# Patient Record
Sex: MALE | Race: WHITE | NOT HISPANIC OR LATINO | Employment: OTHER | URBAN - METROPOLITAN AREA
[De-identification: names, ages, dates, MRNs, and addresses within clinical notes are randomized per-mention and may not be internally consistent; named-entity substitution may affect disease eponyms.]

---

## 2019-05-02 ENCOUNTER — HOSPITAL ENCOUNTER (OUTPATIENT)
Dept: CARDIOLOGY | Facility: HOSPITAL | Age: 64
Discharge: HOME | End: 2019-05-02
Attending: OPHTHALMOLOGY

## 2020-06-25 ENCOUNTER — OFFICE VISIT CONVERTED (OUTPATIENT)
Dept: FAMILY MEDICINE CLINIC | Facility: CLINIC | Age: 65
End: 2020-06-25
Attending: NURSE PRACTITIONER

## 2020-06-25 ENCOUNTER — HOSPITAL ENCOUNTER (OUTPATIENT)
Dept: LAB | Facility: HOSPITAL | Age: 65
Discharge: HOME | End: 2020-06-25
Attending: NURSE PRACTITIONER

## 2020-06-25 ENCOUNTER — CONVERSION ENCOUNTER (OUTPATIENT)
Dept: FAMILY MEDICINE CLINIC | Facility: CLINIC | Age: 65
End: 2020-06-25

## 2020-06-25 LAB
ALBUMIN SERPL-MCNC: 4.5 G/DL (ref 3.5–5)
ALBUMIN/GLOB SERPL: 1.6 {RATIO} (ref 1.4–2.6)
ALP SERPL-CCNC: 72 U/L (ref 56–155)
ALT SERPL-CCNC: 21 U/L (ref 10–40)
ANION GAP SERPL CALC-SCNC: 21 MMOL/L (ref 8–19)
AST SERPL-CCNC: 24 U/L (ref 15–50)
BASOPHILS # BLD AUTO: 0.07 10*3/UL (ref 0–0.2)
BASOPHILS NFR BLD AUTO: 0.9 % (ref 0–3)
BILIRUB SERPL-MCNC: 0.59 MG/DL (ref 0.2–1.3)
BUN SERPL-MCNC: 24 MG/DL (ref 5–25)
BUN/CREAT SERPL: 23 {RATIO} (ref 6–20)
CALCIUM SERPL-MCNC: 10.1 MG/DL (ref 8.7–10.4)
CHLORIDE SERPL-SCNC: 104 MMOL/L (ref 99–111)
CHOLEST SERPL-MCNC: 150 MG/DL (ref 107–200)
CHOLEST/HDLC SERPL: 2.8 {RATIO} (ref 3–6)
CONV ABS IMM GRAN: 0.04 10*3/UL (ref 0–0.2)
CONV CO2: 20 MMOL/L (ref 22–32)
CONV IMMATURE GRAN: 0.5 % (ref 0–1.8)
CONV TOTAL PROTEIN: 7.3 G/DL (ref 6.3–8.2)
CREAT UR-MCNC: 1.03 MG/DL (ref 0.7–1.2)
CRP SERPL-MCNC: 0.6 MG/L (ref 0–5)
DEPRECATED RDW RBC AUTO: 46.1 FL (ref 35.1–43.9)
EOSINOPHIL # BLD AUTO: 0.12 10*3/UL (ref 0–0.7)
EOSINOPHIL # BLD AUTO: 1.5 % (ref 0–7)
ERYTHROCYTE [DISTWIDTH] IN BLOOD BY AUTOMATED COUNT: 13.8 % (ref 11.6–14.4)
ERYTHROCYTE [SEDIMENTATION RATE] IN BLOOD: 2 MM/H (ref 0–20)
EST. AVERAGE GLUCOSE BLD GHB EST-MCNC: 126 MG/DL
GFR SERPLBLD BASED ON 1.73 SQ M-ARVRAT: >60 ML/MIN/{1.73_M2}
GLOBULIN UR ELPH-MCNC: 2.8 G/DL (ref 2–3.5)
GLUCOSE SERPL-MCNC: 143 MG/DL (ref 70–99)
HBA1C MFR BLD: 6 % (ref 3.5–5.7)
HCT VFR BLD AUTO: 44.9 % (ref 42–52)
HDLC SERPL-MCNC: 53 MG/DL (ref 40–60)
HGB BLD-MCNC: 14.7 G/DL (ref 14–18)
LDLC SERPL CALC-MCNC: 84 MG/DL (ref 70–100)
LYMPHOCYTES # BLD AUTO: 1.94 10*3/UL (ref 1–5)
LYMPHOCYTES NFR BLD AUTO: 23.6 % (ref 20–45)
MCH RBC QN AUTO: 29.6 PG (ref 27–31)
MCHC RBC AUTO-ENTMCNC: 32.7 G/DL (ref 33–37)
MCV RBC AUTO: 90.5 FL (ref 80–96)
MONOCYTES # BLD AUTO: 0.72 10*3/UL (ref 0.2–1.2)
MONOCYTES NFR BLD AUTO: 8.7 % (ref 3–10)
NEUTROPHILS # BLD AUTO: 5.34 10*3/UL (ref 2–8)
NEUTROPHILS NFR BLD AUTO: 64.8 % (ref 30–85)
NRBC CBCN: 0 % (ref 0–0.7)
OSMOLALITY SERPL CALC.SUM OF ELEC: 297 MOSM/KG (ref 273–304)
PLATELET # BLD AUTO: 216 10*3/UL (ref 130–400)
PMV BLD AUTO: 11 FL (ref 9.4–12.4)
POTASSIUM SERPL-SCNC: 4.8 MMOL/L (ref 3.5–5.3)
PSA SERPL-MCNC: 0.51 NG/ML (ref 0–4)
RBC # BLD AUTO: 4.96 10*6/UL (ref 4.7–6.1)
SODIUM SERPL-SCNC: 140 MMOL/L (ref 135–147)
T4 FREE SERPL-MCNC: 1.3 NG/DL (ref 0.9–1.8)
TRIGL SERPL-MCNC: 67 MG/DL (ref 40–150)
TSH SERPL-ACNC: 2.12 M[IU]/L (ref 0.27–4.2)
VLDLC SERPL-MCNC: 13 MG/DL (ref 5–37)
WBC # BLD AUTO: 8.23 10*3/UL (ref 4.8–10.8)

## 2020-06-30 ENCOUNTER — HOSPITAL ENCOUNTER (OUTPATIENT)
Dept: CT IMAGING | Facility: HOSPITAL | Age: 65
Discharge: HOME | End: 2020-06-30
Attending: NURSE PRACTITIONER

## 2020-07-29 ENCOUNTER — HOSPITAL ENCOUNTER (OUTPATIENT)
Dept: MRI IMAGING | Facility: HOSPITAL | Age: 65
Discharge: HOME | End: 2020-07-29
Attending: NURSE PRACTITIONER

## 2020-07-29 ENCOUNTER — OFFICE VISIT CONVERTED (OUTPATIENT)
Dept: FAMILY MEDICINE CLINIC | Facility: CLINIC | Age: 65
End: 2020-07-29
Attending: NURSE PRACTITIONER

## 2020-08-13 ENCOUNTER — OFFICE VISIT CONVERTED (OUTPATIENT)
Dept: OTOLARYNGOLOGY | Facility: CLINIC | Age: 65
End: 2020-08-13
Attending: OTOLARYNGOLOGY

## 2020-08-20 ENCOUNTER — CONVERSION ENCOUNTER (OUTPATIENT)
Dept: FAMILY MEDICINE CLINIC | Facility: CLINIC | Age: 65
End: 2020-08-20

## 2020-08-20 ENCOUNTER — OFFICE VISIT CONVERTED (OUTPATIENT)
Dept: FAMILY MEDICINE CLINIC | Facility: CLINIC | Age: 65
End: 2020-08-20
Attending: PHYSICIAN ASSISTANT

## 2020-08-21 ENCOUNTER — CONVERSION ENCOUNTER (OUTPATIENT)
Dept: FAMILY MEDICINE CLINIC | Facility: CLINIC | Age: 65
End: 2020-08-21

## 2020-09-25 ENCOUNTER — HOSPITAL ENCOUNTER (OUTPATIENT)
Dept: LAB | Facility: HOSPITAL | Age: 65
Discharge: HOME | End: 2020-09-25
Attending: NURSE PRACTITIONER

## 2020-09-25 LAB
25(OH)D3 SERPL-MCNC: 57.3 NG/ML (ref 30–100)
ALBUMIN SERPL-MCNC: 4.3 G/DL (ref 3.5–5)
ALBUMIN/GLOB SERPL: 1.5 {RATIO} (ref 1.4–2.6)
ALP SERPL-CCNC: 83 U/L (ref 56–155)
ALT SERPL-CCNC: 22 U/L (ref 10–40)
ANION GAP SERPL CALC-SCNC: 20 MMOL/L (ref 8–19)
AST SERPL-CCNC: 25 U/L (ref 15–50)
BASOPHILS # BLD AUTO: 0.07 10*3/UL (ref 0–0.2)
BASOPHILS NFR BLD AUTO: 0.9 % (ref 0–3)
BILIRUB SERPL-MCNC: 0.47 MG/DL (ref 0.2–1.3)
BUN SERPL-MCNC: 18 MG/DL (ref 5–25)
BUN/CREAT SERPL: 17 {RATIO} (ref 6–20)
CALCIUM SERPL-MCNC: 9.7 MG/DL (ref 8.7–10.4)
CHLORIDE SERPL-SCNC: 105 MMOL/L (ref 99–111)
CHOLEST SERPL-MCNC: 99 MG/DL (ref 107–200)
CHOLEST/HDLC SERPL: 2.3 {RATIO} (ref 3–6)
CONV ABS IMM GRAN: 0.02 10*3/UL (ref 0–0.2)
CONV CO2: 22 MMOL/L (ref 22–32)
CONV IMMATURE GRAN: 0.3 % (ref 0–1.8)
CONV TOTAL PROTEIN: 7.1 G/DL (ref 6.3–8.2)
CREAT UR-MCNC: 1.08 MG/DL (ref 0.7–1.2)
DEPRECATED RDW RBC AUTO: 43.5 FL (ref 35.1–43.9)
EOSINOPHIL # BLD AUTO: 0.16 10*3/UL (ref 0–0.7)
EOSINOPHIL # BLD AUTO: 2.1 % (ref 0–7)
ERYTHROCYTE [DISTWIDTH] IN BLOOD BY AUTOMATED COUNT: 13.2 % (ref 11.6–14.4)
FOLATE SERPL-MCNC: 15.3 NG/ML (ref 4.8–20)
GFR SERPLBLD BASED ON 1.73 SQ M-ARVRAT: >60 ML/MIN/{1.73_M2}
GLOBULIN UR ELPH-MCNC: 2.8 G/DL (ref 2–3.5)
GLUCOSE SERPL-MCNC: 128 MG/DL (ref 70–99)
HCT VFR BLD AUTO: 44.4 % (ref 42–52)
HDLC SERPL-MCNC: 43 MG/DL (ref 40–60)
HGB BLD-MCNC: 14.6 G/DL (ref 14–18)
IRON SATN MFR SERPL: 30 % (ref 20–55)
IRON SERPL-MCNC: 87 UG/DL (ref 70–180)
LDLC SERPL CALC-MCNC: 45 MG/DL (ref 70–100)
LYMPHOCYTES # BLD AUTO: 1.92 10*3/UL (ref 1–5)
LYMPHOCYTES NFR BLD AUTO: 25.1 % (ref 20–45)
MCH RBC QN AUTO: 29.7 PG (ref 27–31)
MCHC RBC AUTO-ENTMCNC: 32.9 G/DL (ref 33–37)
MCV RBC AUTO: 90.4 FL (ref 80–96)
MONOCYTES # BLD AUTO: 0.81 10*3/UL (ref 0.2–1.2)
MONOCYTES NFR BLD AUTO: 10.6 % (ref 3–10)
NEUTROPHILS # BLD AUTO: 4.68 10*3/UL (ref 2–8)
NEUTROPHILS NFR BLD AUTO: 61 % (ref 30–85)
NRBC CBCN: 0 % (ref 0–0.7)
OSMOLALITY SERPL CALC.SUM OF ELEC: 298 MOSM/KG (ref 273–304)
PLATELET # BLD AUTO: 205 10*3/UL (ref 130–400)
PMV BLD AUTO: 10.5 FL (ref 9.4–12.4)
POTASSIUM SERPL-SCNC: 4.7 MMOL/L (ref 3.5–5.3)
RBC # BLD AUTO: 4.91 10*6/UL (ref 4.7–6.1)
SODIUM SERPL-SCNC: 142 MMOL/L (ref 135–147)
TIBC SERPL-MCNC: 292 UG/DL (ref 245–450)
TRANSFERRIN SERPL-MCNC: 204 MG/DL (ref 215–365)
TRIGL SERPL-MCNC: 56 MG/DL (ref 40–150)
VIT B12 SERPL-MCNC: 465 PG/ML (ref 211–911)
VLDLC SERPL-MCNC: 11 MG/DL (ref 5–37)
WBC # BLD AUTO: 7.66 10*3/UL (ref 4.8–10.8)

## 2021-02-04 ENCOUNTER — OFFICE VISIT (OUTPATIENT)
Dept: NEUROLOGY | Facility: CLINIC | Age: 66
End: 2021-02-04

## 2021-02-04 VITALS
DIASTOLIC BLOOD PRESSURE: 92 MMHG | BODY MASS INDEX: 29.35 KG/M2 | HEART RATE: 58 BPM | HEIGHT: 70 IN | SYSTOLIC BLOOD PRESSURE: 152 MMHG | OXYGEN SATURATION: 98 % | WEIGHT: 205 LBS

## 2021-02-04 DIAGNOSIS — E11.42 DIABETIC PERIPHERAL NEUROPATHY (HCC): ICD-10-CM

## 2021-02-04 DIAGNOSIS — I95.1 ORTHOSTASIS: Primary | ICD-10-CM

## 2021-02-04 PROCEDURE — 99204 OFFICE O/P NEW MOD 45 MIN: CPT | Performed by: PSYCHIATRY & NEUROLOGY

## 2021-02-04 RX ORDER — ASPIRIN 81 MG/1
81 TABLET ORAL DAILY
COMMUNITY

## 2021-02-04 RX ORDER — MELOXICAM 7.5 MG/1
TABLET ORAL
COMMUNITY
Start: 2020-12-06 | End: 2021-02-04

## 2021-02-04 RX ORDER — VIT C/E/ZN/COPPR/LUTEIN/ZEAXAN 250MG-90MG
CAPSULE ORAL
COMMUNITY

## 2021-02-04 RX ORDER — ROSUVASTATIN CALCIUM 5 MG/1
TABLET, COATED ORAL
COMMUNITY
Start: 2020-09-20

## 2021-02-04 NOTE — PROGRESS NOTES
CC: Lightheadedness; abnormal MRI of the brain    HPI:  José Lyles is a  65 y.o.  right-handed white male who was sent for neurological consultation by GLADYS Pérez in [place] regarding the symptom of lightheadedness as well as an abnormal MRI of the brain.  The patient states that his symptoms actually started about 6 years ago and have gotten worse over time.  He describes a lightheaded feeling not spinning dizziness.  He denies ever having specific vertigo.  He describes typical symptoms occurring when he sits up or stands up quickly and his vision may blurred or even get black.  He has passed out twice.  He has similar symptoms if he tilts his head up and looks up.  He had an evaluation including an MRI of the brain and CT angiogram of the neck and brain arteries.  He had atrophy and small vessel changes on the MRI.  He had about 52% left ICA stenosis and about 12% right ICA stenosis without vertebrobasilar abnormalities mentioned.  He is followed by ophthalmology starting about 2 years ago and found to have macular degeneration.  He is not on any blood pressure medication or heart medicine.  He had been on Flomax for prostatism before but has not been on that for quite a while.  He states his symptoms are worse after a hot shower or when he is exercising strenuously such as mowing the yard.  He is treated with aspirin 81 mg and Crestor 5 mg.    He has history of head injury in the fourth grade without loss of consciousness.  No meningitis, seizure or known stroke.  There is no family history of stroke, brain tumor brain hemorrhage or aneurysm of the brain artery.  About 2 months ago he developed some left hip pain thought to be sciatica.  Symptoms have improved.  He does not have history of smoking or alcohol or drug abuse.    He describes some degree of numbness in his feet.  He states he previously weighed almost 100 pounds greater than he weighs now.  He states his wife checked a blood sugar  on him and it was 300 and so he dieted and exercised significantly to bring this down.  He states that his hemoglobin A1c has been running between 5.4 and 6.0.  He lost 90 pounds.        Past Medical History:   Diagnosis Date   • Arthritis    • Bell palsy    • Carotid artery occlusion    • Coronary artery disease    • Difficulty walking    • Headache, tension-type    • Kidney stones    • Sleep apnea          Past Surgical History:   Procedure Laterality Date   • COLONOSCOPY     • TONSILLECTOMY             Current Outpatient Medications:   •  aspirin 81 MG EC tablet, Take 81 mg by mouth Daily., Disp: , Rfl:   •  Multiple Vitamins-Minerals (PreserVision AREDS 2) chewable tablet, Chew., Disp: , Rfl:   •  rosuvastatin (CRESTOR) 5 MG tablet, TAKE 1 TABLET BY MOUTH ONCE DAILY, Disp: , Rfl:   •  vitamin D3 125 MCG (5000 UT) capsule capsule, Take 5,000 Units by mouth Daily., Disp: , Rfl:       Family History   Problem Relation Age of Onset   • Dementia Mother    • Migraines Brother          Social History     Socioeconomic History   • Marital status:      Spouse name: Not on file   • Number of children: Not on file   • Years of education: Not on file   • Highest education level: Not on file   Tobacco Use   • Smoking status: Never Smoker   • Smokeless tobacco: Never Used   Substance and Sexual Activity   • Alcohol use: Never     Frequency: Never   • Drug use: Never   • Sexual activity: Defer     Partners: Female         Allergies   Allergen Reactions   • Ciprofloxacin Other (See Comments)   • Penicillins Other (See Comments)   • Sulfamethoxazole-Trimethoprim Other (See Comments)         Pain Scale: 3 or 4/10, low back pain        ROS:  Review of Systems   Constitutional: Negative for activity change, appetite change and fatigue.   HENT: Negative for ear pain, facial swelling and hearing loss.    Eyes: Positive for visual disturbance. Negative for pain, redness and itching.   Respiratory: Positive for apnea. Negative  "for cough, choking and shortness of breath.    Cardiovascular: Negative for chest pain and leg swelling.   Gastrointestinal: Negative for abdominal pain, nausea and vomiting.   Endocrine: Negative for cold intolerance, heat intolerance and polydipsia.   Genitourinary: Positive for flank pain.   Musculoskeletal: Positive for back pain, gait problem and neck pain. Negative for arthralgias and joint swelling.   Skin: Negative for color change, pallor and rash.   Allergic/Immunologic: Negative for environmental allergies and food allergies.   Neurological: Positive for light-headedness. Negative for dizziness, tremors, seizures, syncope, facial asymmetry, speech difficulty, weakness, numbness and headaches.   Hematological: Bruises/bleeds easily.   Psychiatric/Behavioral: Negative for agitation, behavioral problems, confusion, decreased concentration, dysphoric mood, hallucinations, self-injury, sleep disturbance and suicidal ideas. The patient is not nervous/anxious and is not hyperactive.          I have reviewed and agree with the above ROS completed by the medical assistant.      Physical Exam:  Vitals:    02/04/21 1011   BP: 152/92   Pulse: 58   SpO2: 98%   Weight: 93 kg (205 lb)   Height: 177.8 cm (70\")     Orthostatic BP: Supine blood pressure 106/76, heart rate 72.  Standing blood pressure 92/68 heart rate 96.    Body mass index is 29.41 kg/m².    Physical Exam  General: Overweight white male no acute distress  HEENT: Normocephalic no evidence of trauma.  Discs difficult to see.  Throat  Neck: Supple.  No thyromegaly.  No cervical bruits.  Heart: Regular rate and rhythm no murmurs.  No pedal edema.  Extremities: Radial pulses strong and simultaneous.      Neurological Exam:   Mental Status: Awake, alert, oriented to person, place and time.  Conversant without evidence of an affective disorder, thought disorder, delusions or hallucinations.  Attention span and concentration are normal.  HCF: No aphasia, apraxia " or dysarthria.  Recent and remote memory intact.  Knowledge of recent events intact.  CN: I:   II: Visual fields full without left inattention   III, IV, VI: Eye movements intact without nystagmus or ptosis.  Pupils equal round and reactive to light.   V,VII: Light touch and pinprick intact all 3 divisions of V.  Facial muscles symmetrical.   VIII: Hearing intact to finger rub   IX,X: Soft palate elevates symmetrically   XI: Sternomastoid and trapezius are strong.   XII: Tongue midline without atrophy or fasciculations  Motor: Normal tone and bulk in the upper and lower extremities   Power testing: Mild weakness of interossei and abductor pollicis brevis muscles bilaterally with good power otherwise in the upper extremities.  In the lower extremities there is weakness of toe extension and flexion.  All other muscles tested in the legs were normal.  Reflexes: Upper extremities: +1 bicep and brachioradialis jerk trace triceps jerks        Lower extremities: +1 knee jerks and trace ankle jerks        Toe signs: Downgoing  Sensory: Light touch: Diffusely intact upper extremities.  Reduced on the toes        Pinprick: Diffusely intact upper extremities.  Reduced on the toes        Vibration: Essentially normal at the ankle        Position: Normal at the great toes    Cerebellar: Finger-to-nose: Normal           Rapid movement:  normal           Heel-to-shin: Normal  Gait and Station: Comes to stand without difficulty.  Casual walk is slightly broad-based.  He can toe and heel walk adequately.  Tandem walk is mildly challenging.  He titubates on Romberg testing.  No drift      Results:      No results found for: GLUCOSE, BUN, CREATININE, EGFRIFNONA, EGFRIFAFRI, BCR, CO2, CALCIUM, PROTENTOTREF, ALBUMIN, LABIL2, BILIRUBIN, AST, ALT    No results found for: WBC, HGB, HCT, MCV, PLT      .No results found for: RPR      No results found for: TSH, Q3YYKZN, E8CUCEA, THYROIDAB      No results found for: [id number]      No results  found for: FOLATE      No results found for: HGBA1C      No results found for: GLUCOSE, BUN, CREATININE, EGFRIFNONA, EGFRIFAFRI, BCR, K, CO2, CALCIUM, PROTENTOTREF, ALBUMIN, LABIL2, BILIRUBIN, AST, ALT      No results found for: WBC, HGB, HCT, MCV, PLT    I reviewed the records including the MRI of the brain and CT angiogram of the neck and brain arteries by report as noted above      Assessment:   1.  Lightheadedness-this is related to orthostasis.  He did not have vertebrobasilar disease on his CT angiogram.  His standing blood pressure was 92 systolic and his heart rate went up more than 20 bpm.  I think it is most likely related to diabetic neuropathy.  2.  Diabetic peripheral neuropathy-mild symptoms.  Exam appears typical with most distal muscles weak and some sensory deficits in the toes.  3.  Small vessel changes on MRI  4.  Suspect mechanical vertebrobasilar insufficiency with lightheadedness occurring with head tilted back compressing the vertebral arteries at the skull base.  I told the patient that there was no clear remedy for this and that people without artery disease can get it.  Be observant about the symptoms and try to avoid tilting the head back more than just transiently.    Plan:  1.  I agree with risk factor control particularly his lipids with target LDL of less than 70.  Also agree with continued aspirin 81 mg daily  2.  Although his initial blood pressure was elevated in the office at the end of his visit his supine blood pressure was just 106/76.  I would not be at all aggressive at controlling individual blood pressures since it would more likely contribute to his orthostasis.  Treatment of orthostasis includes basically salt and water and we recommended he consider support hose.  After that a trial of Florinef would be an option prior to considering a drug like midodrine which is a vasoconstrictor.  3.  He could be further evaluated for peripheral neuropathy however it appears obvious  that he has a diabetic condition to be the origin based on his own history.  4.  Follow-up if symptoms worsen.  It is reasonable for him to be checking his supine and standing blood pressures at home to follow along and troubleshoot for worsening problems.            Time: 45 minutes              Dictated utilizing Dragon dictation.

## 2021-05-10 NOTE — H&P
"   History and Physical      Patient Name: José Lyles   Patient ID: [id number]   Sex: Male   YOB: 1955    Primary Care Provider: Deb GRAHAM   Referring Provider: Jael GE    Visit Date: August 13, 2020    Provider: Ehsan Marmolejo MD   Location: [facility]   Location Address: [address] [place] [address], [address] [place] [address]  [id number]   Location Phone: [phone]          Chief Complaint     1.  Dizziness    2.  Lightheadedness    3.  Tinnitus       History Of Present Illness  José Lyles is a 65 year old /White male who presents to the office today as a consult from Jale GE.      He presents to the clinic today for evaluation of issues with dizziness, lightheadedness, and ear ringing in the right ear.  He notes that his hearing and ringing issues have been going on for quite some time.  He does not note any association between changes in his hearing or ringing in his dizzy spells.  He feels that his head feels \"swimmy headed.  He denies any significant vertigo with this.  He describes this is more of a lightheaded feeling.  He notes that sometimes he gets lightheadedness during exercise or exertion.  He does have a history of heavy drinking, and still binges at times.  He notes that sometimes he tends to veer while walking.  He did have an MRI of the brain which showed mild atrophy and white matter changes.  IACs were normal.  There is some evidence of scattered lacunar infarcts.  He also had a CT angiogram which demonstrated 52% stenosis in the proximal left internal carotid artery but no major intracranial arteries show decreased flow.       Past Medical History  Acute prostatitis; Arthritis; Bladder Disorder; Chronic prostatitis; Dizziness; Kidney stones; Prostate Disorder; Screening PSA (prostate specific antigen)         Past Surgical History  Colonoscopy; Knee scope with removal of cartilage from knee joint; Rectal surgery; Tonsilectomy " "        Medication List  aspirin 81 mg oral tablet,chewable; Crestor 5 mg oral tablet; PreserVision AREDS-2 [id number] mg-unit-mg-mg oral capsule; Vitamin D3 125 mcg (5,000 unit) oral tablet         Allergy List  Bactrim DS; Cipro; PENICILLINS; SULFA (SULFONAMIDES)         Family Medical History  *No Known Family History         Social History  Alcohol (Current some day); ; Moderate Amount of Exercise (1-3 times weekly); Tobacco (Never)         Immunizations  Name Date Admin   Influenza    Prevnar 13    Tdap          Review of Systems  · Constitutional  o Denies  o : fever, night sweats, weight loss  · Eyes  o Denies  o : discharge from eye, impaired vision  · HENT  o Admits  o : *See HPI  · Cardiovascular  o Denies  o : chest pain, irregular heart beats  · Respiratory  o Denies  o : shortness of breath, wheezing, coughing up blood  · Gastrointestinal  o Denies  o : heartburn, reflux, vomiting blood  · Genitourinary  o Denies  o : frequency  · Integument  o Denies  o : rash, skin dryness  · Neurologic  o Admits  o : loss of balance, dizziness  o Denies  o : seizures, loss of consciousness  · Endocrine  o Admits  o : cold intolerance  o Denies  o : heat intolerance  · Heme-Lymph  o Admits  o : easy bleeding  o Denies  o : anemia      Vitals  Date Time BP Position Site L\R Cuff Size HR RR TEMP (F) WT  HT  BMI kg/m2 BSA m2 O2 Sat        08/13/2020 01:51 PM       16 97.6 214lbs 6oz 5'  10\" 30.76 2.19           Physical Examination  · Constitutional  o Appearance  o : well developed, well-nourished, alert and in no acute distress, voice clear and strong  · Head and Face  o Head  o :   § Inspection  § : no deformities or lesions  o Face  o :   § Inspection  § : No facial lesions; House-Brackmann I/VI bilaterally  § Palpation  § : No TMJ crepitus nor  muscle tenderness bilaterally  · Eyes  o Vision  o :   § Visual Fields  § : Extraocular movements are intact. No spontaneous or gaze-induced " nystagmus.  o Conjunctivae  o : clear  o Sclerae  o : clear  o Pupils and Irises  o : pupils equal, round, and reactive to light.   · Ears, Nose, Mouth and Throat  o Ears  o :   § External Ears  § : appearance within normal limits, no lesions present  § Otoscopic Examination  § : tympanic membrane appearance within normal limits bilaterally without perforations, well-aerated middle ears  § Hearing  § : intact to conversational voice both ears  o Nose  o :   § External Nose  § : appearance normal  § Intranasal Exam  § : mucosa within normal limits, vestibules normal, no intranasal lesions present, septum midline, sinuses non tender to percussion  o Oral Cavity  o :   § Oral Mucosa  § : oral mucosa normal without pallor or cyanosis  § Lips  § : lip appearance normal  § Teeth  § : normal dentition for age  § Gums  § : gums pink, non-swollen, no bleeding present  § Tongue  § : tongue appearance normal; normal mobility  § Palate  § : hard palate normal, soft palate appearance normal with symmetric mobility  o Throat  o :   § Oropharynx  § : no inflammation or lesions present, tonsils within normal limits  § Hypopharynx  § : appearance within normal limits, superior epiglottis within normal limits  § Larynx  § : appearance within normal limits, vocal cords within normal limits, no lesions present  · Neck  o Inspection/Palpation  o : normal appearance, no masses or tenderness, trachea midline; thyroid size normal, nontender, no nodules or masses present on palpation  · Respiratory  o Respiratory Effort  o : breathing unlabored  o Inspection of Chest  o : normal appearance, no retractions  · Cardiovascular  o Heart  o : regular rate and rhythm  · Lymphatic  o Neck  o : no lymphadenopathy present  o Supraclavicular Nodes  o : no lymphadenopathy present  o Preauricular Nodes  o : no lymphadenopathy present  · Skin and Subcutaneous Tissue  o General Inspection  o : Regarding face and neck - there are no rashes present, no  "lesions present, and no areas of discoloration  · Neurologic  o Cranial Nerves  o : cranial nerves II-XII are grossly intact bilaterally  o Gait and Station  o : normal gait, some difficulty with tandem gait, negative Romberg, negative Garett-Hallpike maneuver, negative Fukuda marching test, able to stand without diffculty  · Psychiatric  o Judgement and Insight  o : judgment and insight intact  o Mood and Affect  o : mood normal, affect appropriate          Assessment  · Dizziness     780.4/R42  · Hearing loss     389.9/H91.90  · Tinnitus     388.30/H93.19  · Lightheadedness     780.4/R42    Problems Reconciled  Plan  · Orders  o Audiometry, pure-tone (threshold); [facility] (16193) - 780.4/R42, 389.9/H91.90, 388.30/H93.19 - 08/27/2020  o Tympanogram (Impedance Testing) [facility] (80834) - 780.4/R42, 389.9/H91.90, 388.30/H93.19 - 08/27/2020  · Medications  o Medications have been Reconciled  o Transition of Care or Provider Policy  · Instructions  o He presents to the clinic today for evaluation of issues with dizziness, lightheadedness, and ear ringing in the right ear. He notes that his hearing and ringing issues have been going on for quite some time. He does not note any association between changes in his hearing or ringing in his dizzy spells. He feels that his head feels \"swimmy headed. He denies any significant vertigo with this. He describes this is more of a lightheaded feeling. He notes that sometimes he gets lightheadedness during exercise or exertion. He does have a history of heavy drinking, and still binges at times. He notes that sometimes he tends to veer while walking. He did have an MRI of the brain which showed mild atrophy and white matter changes. IACs were normal. There is some evidence of scattered lacunar infarcts. He also had a CT angiogram which demonstrated 52% stenosis in the proximal left internal carotid artery but no major intracranial arteries show decreased flow. Examination today, there " was no evidence of focal vestibular dysfunction, but he did note lightheadedness on arising from a laying back position. This may be related to orthostatic hypotension. We discussed adequate hydration and taking time when switching from a laying down or sitting to standing position to avoid syncope. I will plan on obtaining an audiogram and tympanogram and we will see him back in the clinic in 3 months. I did mention some form of vestibular exercises that I will have him do. He is also due to have evaluation by neurology for his CTA findings. We also discussed formal vestibular testing should there be any progression to his symptoms or failure to improve.  o Electronically Identified Patient Education Materials Provided Electronically            Electronically Signed by: Ehsan Marmolejo MD -Author on August 27, 2020 12:59:53 PM

## 2021-05-13 NOTE — PROGRESS NOTES
Progress Note      Patient Name: oJsé Lyles   Patient ID: [id number]   Sex: Male   YOB: 1955    Primary Care Provider: Jael GE   Referring Provider: Jaime Blue MD    Visit Date: June 25, 2020    Provider: LUMA Harry   [facility]   Location Address: [address] [place] [address], [address]  [place]  [id number]   Location Phone: [phone]          Chief Complaint  · patient establishment  · dizziness     He is here to establish care. He was previously seeing Jaime Felipe. He has a history of elevated blood sugar without the diagnosis of diabetes, Macular Degeneration, and arthritis. He saw Hakeem in 2019 and has a follow up July 6th. His last labs were November 2019 and his HgA1c was 5.9% at that time. He has been having an issue with dizziness but was told that it was due to inner ear and old age. He was told to take Zyrtec but it has not helped. He denies chest pain, nausea.      Colonoscopy 2020 [facility]--called for records today  Hakeem 6/2019  labs November 2019         History Of Present Illness  José Lyles is a 65 year old /White male who presents for evaluation and treatment of:      the patient presents in the office today as a new patient and he has h/o impaired glucose tolerance and chronic daily vertigo and he also has a h/o of bleeding in the back of both eyes or macular degeneration or both it is hard to tell from the patient stated history I will try to get records from ophthalmology he stated he has been told he has the beginning stages of macular degeneration and he has OA and he has a h/o has growth in rectum and hyperplastic polyp in his colon  at , h/o chronic pain left hip and back had a long time he was on Mobic after his colonoscopy the hip pain got better I looked on an old CT and he has T12 compression fracture anterior and 18% we have discussed this today and he stated he has never been told this and on  his   ophth report he has OIS billy with age related cataracts and he was referred to vascular for carotid duplex which showed mod-severe non-stenotic calcified plaque so we are going to look a bit closer at this and see if he has any other vascular changes to account for his eyes and his dizziness he states he was not told about the OIS either I have put him on baby asa and crestor       Past Medical History  Disease Name Date Onset Notes   Acute prostatitis 12/16/2015 --    Arthritis --  --    Bladder Disorder --  --    Chronic prostatitis 01/19/2016 --    Kidney stones --  --    Prostate Disorder --  --    Screening PSA (prostate specific antigen) --  --          Past Surgical History  Procedure Name Date Notes   Colonoscopy --  --    Knee scope with removal of cartilage from knee joint --  --    Tonsilectomy --  --          Medication List  Name Date Started Instructions   Crestor 5 mg oral tablet 06/25/2020 take 1 tablet (5 mg) by oral route once daily   Mobic 15 mg oral tablet  take 1 tablet (15 mg) by oral route once daily   PreserVision AREDS-2 869-338-74-1 mg-unit-mg-mg oral capsule  take 2 capsules by oral route daily         Allergy List  Allergen Name Date Reaction Notes   Bactrim DS --  rash --    PENICILLINS --  swelling --    SULFA (SULFONAMIDES) --  --  --          Family Medical History  Disease Name Relative/Age Notes   *No Known Family History  --          Social History  Finding Status Start/Stop Quantity Notes   Alcohol Current some day 20/-- --  drinks no  drinks monthly; beer and liquor    --  --/-- --  --    Moderate Amount of Exercise (1-3 times weekly) --  --/-- --  --    Tobacco Never --/-- --  never a smoker         Immunizations  NameDate Admin Mfg Trade Name Lot Number Route Inj VIS Given VIS Publication   Tmdiidgnj90/01/2019 SKB Fluzone Quadrivalent  NE NE 06/25/2020    Comments:    Prevnar 1310/01/2019 NE Not Entered  NE NE 06/25/2020    Comments:    Tdap01/01/2020 KWADWO BOOSTRIX   "NE NE 06/25/2020    Comments:          Review of Systems  · Constitutional  o Denies  o : fever, weight gain, weight loss, malaise/fatigue  · Eyes  o Admits  o : ocular ischemic syndrome recent visual changes  o Denies  o : diplopia, redness of eye, eye pain, discharge from eye  · HENT  o Denies  o : sore throat, hearing changes, tinnitus, nose bleeding, sinus pain, nasal discharge, ear pain  · Cardiovascular  o Admits  o : mod plaque billy carotids no stenosis  o Denies  o : palpitation, chest pain, claudication, pedal edema  · Respiratory  o Denies  o : shortness of breath, [facility], cough  · Gastrointestinal  o Denies  o : nausea, vomiting, reflux, diarrhea, constipation, abdominal pain, blood in stools  · Genitourinary  o Denies  o : frequency, urgency, dysuria, incontinence, nocturia,   · Neurologic  o Admits  o : dizziness chronic  o Denies  o : unsteady gait, weakness, H/A  · Musculoskeletal  o Admits  o : left hip pain h/o compression fracture T12  o Denies  o : myalgias, joint swelling  · Endocrine  o Admits  o : impaired glucose tolerance  o Denies  o : heat intolerance, cold intolerance, polyuria, polydipsia  · Psychiatric  o Denies  o : mood changes, hallucinations, memory  · Heme-Lymph  o Denies  o : easy bleeding, easy bruising, edema, lymph node enlargement or tenderness      Vitals  Date Time BP Position Site L\R Cuff Size HR RR TEMP (F) WT  HT  BMI kg/m2 BSA m2 O2 Sat        06/25/2020 09:20 /74 Sitting    75 - R   222lbs 0oz 5'  10\" 31.85 2.23 99 %          Physical Examination  · Constitutional  o Appearance  o : well-nourished, well developed, alert, in no acute distress  · Ears, Nose, Mouth and Throat  o Ears  o :   § External Ears  § : appearance within normal limits, no lesions present  § Otoscopic Examination  § : tympanic membrane appearance within normal limits bilaterally without perforations, mobility normal  o Nose  o :   § External Nose  § : normal stucture noted.  o Oral Cavity  o : "   § Oral Mucosa  § : oral mucosa normal without pallor or cyanosis  § Lips  § : lip appearance normal  § Teeth  § : normal dentition for age  § Gums  § : gums pink, non-swollen, no bleeding present  § Tongue  § : tongue appearance normal  § Palate  § : hard palate normal, soft palate appearance normal  o Throat  o :   § Oropharynx  § : no inflammation or lesions present, tonsils within normal limits  · Neck  o Thyroid  o : gland size normal, nontender, no nodules or masses present on palpation, symmetric  · Respiratory  o Respiratory Effort  o : breathing unlabored  o Auscultation of Lungs  o : normal breath sounds throughout  · Cardiovascular  o Heart  o :   § Auscultation of Heart  § : regular rate and rhythm, no murmurs, gallops or rubs  § Palpation of Heart  § : normal apical impulse, no cardiac thrill present  o Peripheral Vascular System  o :   § Carotid Arteries  § : normal pulses bilaterally, no bruits present  § Extremities  § : no cyanosis, clubbing or edema; less than 2 second refill noted  · Gastrointestinal  o Abdominal Examination  o : abdomen nontender to palpation, tone normal without rigidity or guarding, no masses present, abdominal contour scaphoid  o Liver and spleen  o : no hepatomegaly present, liver nontender to palpation, spleen not palpable  · Skin and Subcutaneous Tissue  o General Inspection  o : no rashes or lesions present, no areas of discoloration  · Neurologic  o Mental Status Examination  o :   § Orientation  § : grossly oriented to person, place and time  o Cranial Nerves  o : cranial nerves intact and symmetric throughout  · Psychiatric  o Mood and Affect  o : mood normal, affect appropriate, denies any SI/HI          Assessment  · Impaired fasting glucose     790.21/R73.01  · Other long term (current) drug therapy     V58.69/Z79.899  · Screening for depression     V79.0/Z13.89  · Screening for lipid disorders     V77.91/Z13.220  · Screening for prostate  cancer     V76.44/Z12.5  · Pre-syncope     780.2/R55  · Dizziness     780.4/R42  · Macular degeneration     362.50/H35.30  · Elevated glucose     790.29/R73.09  · Carotid artery calcification, bilateral     433.10/I65.23  · 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      Plan  · Orders  o Lipid Panel [facility] (75256) - V77.91/Z13.220 - 06/25/2020  o PSA Ultrasensitive, ANNUAL SCREENING [facility] (12292, ) - V76.44/Z12.5 - 06/25/2020  o CBC with Auto Diff [facility] (37480) - V58.69/Z79.899 - 06/25/2020  o CMP [facility] (82657) - V58.69/Z79.899 - 06/25/2020  o Hgb A1c [facility] (40388) - V58.69/Z79.899, 790.21/R73.01 - 06/25/2020  o Lipid Panel [facility] (34257) - 433.10/I65.23 - 06/25/2020  o Thyroid Profile (92715, 15522, THYII) - V58.69/Z79.899 - 06/25/2020  o ACO-39: Current medications updated and reviewed () - - 06/25/2020  o ACO-18: Negative screen for clinical depression using a standardized tool () - - 06/25/2020  o ACO-15: Pneumococcal Vaccine Administered or Previously Received (4040F) - - 06/25/2020  o ACO-14: Influenza immunization administered or previously received () - - 06/25/2020  o ACO-19: Colorectal cancer screening results documented and reviewed (3017F) - - 06/25/2020  o ACO-13: Fall Risk Screening with no falls in past year or only one fall without injury in the past year (1101F) - - 06/25/2020  o ACO - Pt declines to or was not able to provide an Advance Care Plan or name a Surrogate Decision Maker (1124F) - - 06/25/2020  o CRP (Inflammation) [facility] (61631) - 433.10/I65.23, 362.84/H35.82 - 06/25/2020  o ESR (47714) - 433.10/I65.23, 790.21/R73.01, 362.84/H35.82 - 06/25/2020  o CTA Head with IV Contrast [facility]; no Oral Prep (34197) - - 06/25/2020  o CTA Neck (Not Cervical Spine) without and with IV Contrast [facility]; no Oral Prep (00245) - - 06/25/2020  · Medications  o aspirin 81 mg oral tablet,chewable   SIG: chew 1 tablet (81 mg) by oral route once daily for 90 days   DISP: (90) tablets with 0 refills  Prescribed on  06/25/2020     o Medications have been Reconciled  o Transition of Care or Provider Policy  · Instructions  o Depression Screen completed and scanned into the EMR under the designated folder within the patient's documents.  o Handouts were given to patient:   o Patient is taking medications as prescribed and doing well.   o Take all medications as prescribed/directed.  o Patient was educated/instructed on their diagnosis, treatment and medications prior to discharge from the clinic today.  o Patient instructed to seek medical attention urgently for new or worsening symptoms.  o Call the office with any concerns or questions.  o Risks, benefits, and alternatives were discussed with the patient. The patient is aware of risks associated with: carotid disease  o Chronic conditions reviewed and taken into consideration for today's treatment plan.  · Disposition  o Call or Return if symptoms worsen or persist.  o follow up 6 weeks  o follow up prn or as needed  o Care Transition            Electronically Signed by: Jael Guido APRN -Author on June 29, 2020 02:00:56 PM

## 2021-05-13 NOTE — PROGRESS NOTES
Progress Note      Patient Name: José Lyles   Patient ID: [id number]   Sex: Male   YOB: 1955    Primary Care Provider: Deb GRAHAM   Referring Provider: Deb GRAHAM    Visit Date: August 20, 2020    Provider: GLADYS Kwan   Location: [facility]   Location Address: [address] [place] [address], [address]  [place], [place]  [id number]   Location Phone: [phone]          Chief Complaint  · follow up MRI results       History Of Present Illness  José Lyles is a 65 year old /White male who presents for evaluation and treatment of:      patient states he was called to come in for MRI results. Pt has been experiencing lightheadness for several months along with abnormal bleeding of eye seen by opthamalogy. He had CTA that showed mild to moderate atherosclerotic calcification at the right and left ICA (11% and 52% respectively) and was referred to Vascular and started on ASA and Crestor; pt had normal lipid panel prior to Crestor and does not smoke. He does have a h/o obesity but lost 85 pounds with diet and exercise. His sx are worse with mowing yard, looking up or strenuous activity. He is also seeing ENT. MRI of brain shows lacunar infarcts.       Past Medical History  Disease Name Date Onset Notes   Acute prostatitis 12/16/2015 --    Arthritis --  --    Bladder Disorder --  --    Chronic prostatitis 01/19/2016 --    Dizziness --  --    Kidney stones --  --    Prostate Disorder --  --    Screening PSA (prostate specific antigen) --  --          Past Surgical History  Procedure Name Date Notes   Colonoscopy --  --    Knee scope with removal of cartilage from knee joint --  --    Rectal surgery --  --    Tonsilectomy --  --          Medication List  Name Date Started Instructions   aspirin 81 mg oral tablet,chewable 06/25/2020 chew 1 tablet (81 mg) by oral route once daily for 90 days   Crestor 5 mg oral tablet 06/25/2020 take 1 tablet (5 mg) by oral route once  "daily   PreserVision AREDS-2 [id number] mg-unit-mg-mg oral capsule  take 1 capsule by oral route 2 times a day   Vitamin D3 125 mcg (5,000 unit) oral tablet  take 1 tablet by oral route daily         Allergy List  Allergen Name Date Reaction Notes   Bactrim DS --  rash --    Cipro --  --  --    PENICILLINS --  swelling --    SULFA (SULFONAMIDES) --  --  --          Family Medical History  Disease Name Relative/Age Notes   *No Known Family History  --          Social History  Finding Status Start/Stop Quantity Notes   Alcohol Current some day 20/-- --  drinks no  drinks monthly; beer and liquor    --  --/-- --  --    Moderate Amount of Exercise (1-3 times weekly) --  --/-- --  --    Tobacco Never --/-- --  never a smoker         Immunizations  NameDate Admin Mfg Trade Name Lot Number Route Inj VIS Given VIS Publication   Suguvnxnc03/01/2019 SKB Fluzone Quadrivalent  NE NE 06/25/2020    Comments:    Prevnar 1310/01/2019 NE Not Entered  NE NE 06/25/2020    Comments:    Tdap01/01/2020 SKB BOOSTRIX  NE NE 06/25/2020    Comments:          Review of Systems  · Constitutional  o Denies  o : fever, fatigue, weight loss, weight gain  · Cardiovascular  o Denies  o : lower extremity edema, claudication, chest pressure, palpitations  · Respiratory  o Denies  o : shortness of breath, wheezing, cough, hemoptysis, dyspnea on exertion  · Gastrointestinal  o Denies  o : nausea, vomiting, diarrhea, constipation, abdominal pain      Vitals  Date Time BP Position Site L\R Cuff Size HR RR TEMP (F) WT  HT  BMI kg/m2 BSA m2 O2 Sat HC       08/20/2020 01:24 /62 Sitting    77 - R  97.5 213lbs 6oz 5'  10\" 30.62 2.19 96 %        08/21/2020 05:18 /70 Sitting    80 - R   08/21/2020 05:18 PM 98/64 Standing    80 - R   08/20/2020 05:18 /70 Left Side-Lying    76 - R             Physical Examination  · Constitutional  o Appearance  o : well developed, well-nourished, no acute distress  · Head and Face  o Head  o : " normocephalic, atraumatic  · Ears, Nose, Mouth and Throat  o Ears  o :   § External Ears  § : external auditory canal appearance normal, no discharge present  § Otoscopic Examination  § : tympanic membranes pearly white/gray bilaterally  o Nose  o :   § External Nose  § : no lesions noted  § Nasopharynx  § : no discharge present  o Oral Cavity  o :   § Oral Mucosa  § : oral mucosa light pink  o Throat  o :   § Oropharynx  § : tonsils without exudate, no palatal petechiae  · Neck  o Inspection/Palpation  o : normal appearance, no masses or tenderness, trachea midline  o Thyroid  o : gland size normal, nontender, no nodules or masses present on palpation  · Respiratory  o Respiratory Effort  o : breathing unlabored  o Inspection of Chest  o : chest rise symmetric bilaterally  o Auscultation of Lungs  o : clear to auscultation bilaterally throughout inspiration and expiration  · Cardiovascular  o Heart  o :   § Auscultation of Heart  § : regular rate and rhythm, no murmurs, gallops or rubs  o Peripheral Vascular System  o :   § Extremities  § : no edema  · Lymphatic  o Neck  o : no cervical lymphadenopathy, no supraclavicular lymphadenopathy  · Psychiatric  o Mood and Affect  o : mood normal, affect appropriate              Assessment  · Dizziness     780.4/R42  · Atherosclerosis of both carotid arteries       Occlusion and stenosis of bilateral carotid arteries     433.10/I65.23  · Lacunar infarction     434.91/I63.81  · Hypotension     458.9/I95.9       Discussed referral to both neuro for eval of lacunar infarcts and cardiology for postural hypotension. Continue to stay well hydrated, eat well balanced frequent meals, change positions slowly and seek immediate medical attentions for worsening sx.     Problems Reconciled  Plan  · Orders  o ACO-39: Current medications updated and reviewed () - - 08/20/2020  o NEUROLOGY CONSULTATION. (NEURO) - 780.4/R42, 434.91/I63.81 - 08/21/2020   pt prefers neuro at [facility].  o CARDIOLOGY CONSULTATION (CARDI) - 780.4/R42, 458.9/I95.9 - 08/21/2020  · Medications  o Medications have been Reconciled  o Transition of Care or Provider Policy  · Instructions  o Patient was educated/instructed on their diagnosis, treatment and medications prior to discharge from the clinic today.  o Patient instructed to seek medical attention urgently for new or worsening symptoms.  o Call the office with any concerns or questions.  o Electronically Identified Patient Education Materials Provided Electronically  · Disposition  o Follow Up 2 months.            Electronically Signed by: GLADYS Kwan -Author on August 21, 2020 05:25:37 PM

## 2021-05-13 NOTE — PROGRESS NOTES
"   Progress Note      Patient Name: José Lyles   Patient ID: [id number]   Sex: Male   YOB: 1955    Primary Care Provider: Jael GE   Referring Provider: Jaime Blue MD    Visit Date: July 29, 2020    Provider: LUMA Harry   Location: [facility]   Location Address: [address] [place] [address], [address]  [place], [place]  [id number]   Location Phone: [phone]          Chief Complaint  · follow up on Crestor  · dizziness     he had dizziness prior to being prescribed Crestor but he feels like it is worse now. He felt \"swimmy headed\" after drinking a beer a few days ago and he passed out. It was the first beer that he had in a month so he was unsure if it was related to the alcohol or something else.       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  He was sent to vascular surgeon and he was told his vascular isssues are not related to his unsteadiness.  He states he has also been dizzy he states he gets lightneaded when he is out exercising and stops then if he starts again it goes away and same way with mowing he gets hot and lightheaded and goes inside and he cannot focus and he drinks water it goes away.  He denies any h/o migraine he was diagnosed with concussion at age 7 .The patient states he is unable to walk a straight line.  He now states he has billy tinnitus but the left is worse he is unaware of any hearing loss.  We will do an MRI of the brain and IACs to look for lacunar ischemic disease or cholesteatoma and get an ENT consult to rule out Mnire's disease.  The patient is concerned that Crestor is causing his symptoms he takes 5mg by mouth every morning I have advised him to cut this in half and take it at night if his symptoms get better fine if " not go back up on the 5 mg.       Past Medical History  Disease Name Date Onset Notes   Acute prostatitis 12/16/2015 --    Arthritis --  --    Bladder Disorder --  --    Chronic prostatitis 01/19/2016 --    Kidney stones --  --    Prostate Disorder --  --    Screening PSA (prostate specific antigen) --  --          Past Surgical History  Procedure Name Date Notes   Colonoscopy --  --    Knee scope with removal of cartilage from knee joint --  --    Tonsilectomy --  --          Medication List  Name Date Started Instructions   aspirin 81 mg oral tablet,chewable 06/25/2020 chew 1 tablet (81 mg) by oral route once daily for 90 days   Crestor 5 mg oral tablet 06/25/2020 take 1 tablet (5 mg) by oral route once daily   Mobic 15 mg oral tablet  take 1 tablet (15 mg) by oral route once daily   PreserVision AREDS-2 [id number] mg-unit-mg-mg oral capsule  take 2 capsules by oral route daily         Allergy List  Allergen Name Date Reaction Notes   Bactrim DS --  rash --    PENICILLINS --  swelling --    SULFA (SULFONAMIDES) --  --  --          Family Medical History  Disease Name Relative/Age Notes   *No Known Family History  --          Social History  Finding Status Start/Stop Quantity Notes   Alcohol Current some day 20/-- --  drinks no  drinks monthly; beer and liquor    --  --/-- --  --    Moderate Amount of Exercise (1-3 times weekly) --  --/-- --  --    Tobacco Never --/-- --  never a smoker         Immunizations  NameDate Admin Mfg Trade Name Lot Number Route Inj VIS Given VIS Publication   Vuvfcmcoz65/01/2019 SKB Fluzone Quadrivalent  NE NE 06/25/2020    Comments:    Prevnar 1310/01/2019 NE Not Entered  NE NE 06/25/2020    Comments:    Tdap01/01/2020 SKB BOOSTRIX  NE NE 06/25/2020    Comments:          Review of Systems  · Constitutional  o Denies  o : fever, weight gain, weight loss, malaise/fatigue  · Eyes  o Admits  o : . History of ocular ischemic syndrome  o Denies  o : diplopia, recent changes, blurred  "vision, redness of eye, eye pain, discharge from eye  · HENT  o Admits  o : Planes of tinnitus.  o Denies  o : sore throat, hearing changes, tinnitus, nose bleeding, sinus pain, nasal discharge, ear pain  · Cardiovascular  o Admits  o : . history of carotid stenosis, history of hypertension and hyperlipidemia.  o Denies  o : palpitation, chest pain, claudication, pedal edema  · Respiratory  o Denies  o : shortness of breath, [facility], cough  · Gastrointestinal  o Denies  o : nausea, vomiting, reflux, diarrhea, constipation, abdominal pain, blood in stools  · Genitourinary  o Denies  o : frequency, urgency, dysuria, incontinence, nocturia,  · Neurologic  o Admits  o : . Complains of ataxia complaints of dizziness.  o Denies  o : unsteady gait, weakness,  · Musculoskeletal  o Denies  o : myalgias, joint pain, joint swelling  · Endocrine  o Admits  o : . History of impaired glucose tolerance  o Denies  o : heat intolerance, cold intolerance, polyuria, polydipsia  · Psychiatric  o Denies  o : suicidal ideation, homicidal ideation, mood changes, hallucinations, memory  · Heme-Lymph  o Admits  o : Patient is taking aspirin.  o Denies  o : easy bleeding, easy bruising, edema, lymph node enlargement or tenderness      Vitals  Date Time BP Position Site L\R Cuff Size HR RR TEMP (F) WT  HT  BMI kg/m2 BSA m2 O2 Sat        07/29/2020 08:49 /78 Sitting    76 - R   218lbs 0oz 5'  10\" 31.28 2.21 97 %          Physical Examination  · Constitutional  o Appearance  o : well-nourished, well developed, alert, in no acute distress  · Respiratory  o Respiratory Effort  o : breathing unlabored  o Auscultation of Lungs  o : normal breath sounds throughout  · Cardiovascular  o Heart  o :   § Auscultation of Heart  § : regular rate and rhythm, no murmurs, gallops or rubs  § Palpation of Heart  § : normal apical impulse, no cardiac thrill present  o Peripheral Vascular System  o :   § Carotid Arteries  § : normal pulses bilaterally, no " bruits present  § Extremities  § : no cyanosis, clubbing or edema; less than 2 second refill noted  · Skin and Subcutaneous Tissue  o General Inspection  o : no rashes or lesions present, no areas of discoloration  · Neurologic  o Mental Status Examination  o :   § Orientation  § : grossly oriented to person, place and time  o Cranial Nerves  o : cranial nerves intact and symmetric throughout  · Psychiatric  o Mood and Affect  o : mood normal, affect appropriate, denies any SI/HI          Assessment  · Essential hypertension     401.9/I10  · Hyperlipidemia     272.4/E78.5  · Impaired fasting glucose     790.21/R73.01  · Dizziness     780.4/R42  · Syncope     780.2/R55  · Ataxia     781.3/R27.0  · Carotid stenosis     433.10/I65.29  · 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  · Tinnitus     388.30/H93.19      Plan  · Orders  o ACO-39: Current medications updated and reviewed () - - 07/29/2020  o MRI Brain with IACs without and with Contrast [facility] ([id number]) - - 07/29/2020  o ENT CONSULTATION ([facility]) - - 07/29/2020  · Medications  o Medications have been Reconciled  o Transition of Care or Provider Policy  · Instructions  o Advised that cheeses and other sources of dairy fats, animal fats, fast food, and the extras (candy, pastries, pies, doughnuts and cookies) all contain LDL raising nutrients. Advised to increase fruits, vegetables, whole grains, and to monitor portion sizes.   o Handouts were given to patient:   o Patient is taking medications as prescribed and doing well.   o Take all medications as prescribed/directed.  o Patient was educated/instructed on their diagnosis, treatment and medications prior to discharge from the clinic today.  o Patient instructed to seek medical attention urgently for new or worsening symptoms.  o Call the office with any concerns or questions.  o Chronic conditions reviewed and taken into consideration for today's treatment plan.  · Disposition  o Call or Return if symptoms  worsen or persist.  o follow up 3 months  o follow up prn or as needed  o Care Transition            Electronically Signed by: LUMA Harry -Author on July 29, 2020 08:48:08 PM

## 2021-05-14 VITALS — DIASTOLIC BLOOD PRESSURE: 64 MMHG | HEART RATE: 80 BPM | SYSTOLIC BLOOD PRESSURE: 98 MMHG

## 2021-05-15 VITALS
OXYGEN SATURATION: 99 % | DIASTOLIC BLOOD PRESSURE: 74 MMHG | HEIGHT: 70 IN | SYSTOLIC BLOOD PRESSURE: 120 MMHG | WEIGHT: 222 LBS | BODY MASS INDEX: 31.78 KG/M2 | HEART RATE: 75 BPM

## 2021-05-15 VITALS
TEMPERATURE: 97.5 F | OXYGEN SATURATION: 96 % | SYSTOLIC BLOOD PRESSURE: 119 MMHG | HEIGHT: 70 IN | DIASTOLIC BLOOD PRESSURE: 62 MMHG | BODY MASS INDEX: 30.55 KG/M2 | WEIGHT: 213.37 LBS | HEART RATE: 77 BPM

## 2021-05-15 VITALS
SYSTOLIC BLOOD PRESSURE: 130 MMHG | HEART RATE: 76 BPM | DIASTOLIC BLOOD PRESSURE: 78 MMHG | OXYGEN SATURATION: 97 % | HEIGHT: 70 IN | WEIGHT: 218 LBS | BODY MASS INDEX: 31.21 KG/M2

## 2021-05-15 VITALS — WEIGHT: 214.37 LBS | RESPIRATION RATE: 16 BRPM | TEMPERATURE: 97.6 F | BODY MASS INDEX: 30.69 KG/M2 | HEIGHT: 70 IN

## 2021-05-16 VITALS — DIASTOLIC BLOOD PRESSURE: 70 MMHG | HEART RATE: 76 BPM | SYSTOLIC BLOOD PRESSURE: 112 MMHG

## 2024-07-30 ENCOUNTER — TRANSCRIBE ORDERS (OUTPATIENT)
Dept: ADMINISTRATIVE | Facility: HOSPITAL | Age: 69
End: 2024-07-30
Payer: MEDICARE

## 2024-07-30 DIAGNOSIS — R55 SYNCOPE, UNSPECIFIED SYNCOPE TYPE: Primary | ICD-10-CM

## 2024-08-20 ENCOUNTER — HOSPITAL ENCOUNTER (OUTPATIENT)
Dept: CARDIOLOGY | Facility: HOSPITAL | Age: 69
Discharge: HOME | End: 2024-08-20
Admitting: NURSE PRACTITIONER
Payer: MEDICARE

## 2024-08-20 VITALS — HEIGHT: 70 IN | BODY MASS INDEX: 30.64 KG/M2 | WEIGHT: 214 LBS

## 2024-08-20 DIAGNOSIS — R55 SYNCOPE, UNSPECIFIED SYNCOPE TYPE: ICD-10-CM

## 2024-08-20 PROCEDURE — 93660 TILT TABLE EVALUATION: CPT | Performed by: INTERNAL MEDICINE

## 2024-08-20 PROCEDURE — 93660 TILT TABLE EVALUATION: CPT

## 2024-08-20 NOTE — DISCHARGE INSTRUCTIONS
Cardiovascular Services Phone Number: [phone]    Normal activities may be resumed after you are released from the hospital.  Normal consumption of foods and liquids may be resumed immediately after the study.  Contact your regular physician for further evaluation if your symptoms occur again.  Resume your medications, following your doctor's instructions.  Notify your doctor if you experience your symptoms while taking your medications  Do not stop taking your medication without notifying your doctor.  Medications given during the procedure sometimes cause irritation to the vein where your IV was inserted. If this should happen, apply warm soaks to the area. If this condition persists, contact your doctor.    In the event of an emergency, call 911 or go to your nearest emergency room.

## 2024-08-29 ENCOUNTER — OFFICE VISIT (OUTPATIENT)
Dept: CARDIOLOGY | Facility: CLINIC | Age: 69
End: 2024-08-29
Payer: MEDICARE

## 2024-08-29 VITALS
HEART RATE: 73 BPM | HEIGHT: 70 IN | WEIGHT: 212.2 LBS | SYSTOLIC BLOOD PRESSURE: 117 MMHG | BODY MASS INDEX: 30.38 KG/M2 | DIASTOLIC BLOOD PRESSURE: 66 MMHG

## 2024-08-29 DIAGNOSIS — I95.1 ORTHOSTATIC HYPOTENSION: Primary | ICD-10-CM

## 2024-08-29 RX ORDER — MIDODRINE HYDROCHLORIDE 5 MG/1
5 TABLET ORAL
Qty: 90 TABLET | Refills: 3 | Status: SHIPPED | OUTPATIENT
Start: 2024-08-29

## 2024-08-29 RX ORDER — LATANOPROST 50 UG/ML
1 SOLUTION/ DROPS OPHTHALMIC DAILY
COMMUNITY
Start: 2024-08-26

## 2024-08-29 NOTE — PROGRESS NOTES
[facility]  Interventional Cardiology Patient Visit Note      Referring Provider:  Jael Guido, APRN  [address]  [address]  [place] [address]    Reason for Referral:   Orthostatic Hypotension    History of Presenting Illness:  History of Present Illness  The patient presents for evaluation of orthostatic hypotension. He is accompanied by her spouse    Patient has been experience syncope in past 2 to 3 years.He was advised by his nurse practitioner to undergo a heart monitor test due to concerns about his heart health. This was prompted by episodes of low blood pressure, leading to a tilt table test last week. His blood pressure dropped to 77/45 during the test, although he did not experience fainting or lightheadedness at that time. He has a history of low blood pressure, typically around 114/70.  His spouse reports that he has fainted several times at home and often feels lightheaded, particularly when looking upwards for extended periods. He does not take any medications that could slow his heart rate.    He has been experiencing fainting spells for a couple of years, occurring 2 to 3 times annually. These episodes were initially thought to be strokes, leading to a referral to a vascular doctor. He describes feeling lightheaded and having cloudy vision before fainting, but recovers quickly upon waking. He is retired and does not operate heavy machinery. He has not injured himself during these fainting spells.    He also reports prediabetes and recent signs of neuropathy in his feet, including burning sensations. He has been using steroid eye drops for over 2 years, which he believes may be related to his symptoms. He does not believe there is any connection between his cataract surgery and his fainting spells. He recalls feeling lightheaded on 08/01/2024 while mowing the yard, which required him to rest and hydrate. His activity level is low due to frequent  lightheadedness.    He has not undergone any vein-related surgeries but has severe varicose veins, particularly in his left leg. He finds some relief from wearing compression stockings. He does not experience any itching in his legs and has not tried any medications for his varicose veins.     He consumes at least 64 ounces of water daily. He has not taken midodrine or any other medication to increase his blood pressure. He does not identify any specific triggers for his lightheadedness. He notes that his symptoms worsen after eating carbohydrates, but not after consuming proteins.    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 Ever since he had cataract surgery, his condition has worsened.    Past Medical History  Past Medical History:   Diagnosis Date    Arthritis     Bell palsy     Carotid artery occlusion     Coronary artery disease     Difficulty walking     Headache, tension-type     Kidney stones     Prostate infection     Sleep apnea     Uveitis     bilateral         Current Outpatient Medications:     aspirin 81 MG EC tablet, Take 1 tablet by mouth Daily., Disp: , Rfl:     Cholecalciferol 125 MCG (5000 UT) tablet, Vitamin D3 125 mcg (5,000 unit) oral tablet take 1 tablet by oral route daily   Active, Disp: , Rfl:     dorzolamide-timolol (COSOPT) 2-0.5 % ophthalmic solution, instill 1 drop into each eye twice daily, Disp: , Rfl:     folic acid (FOLVITE) 1 MG tablet, Take 1 tablet by mouth Daily., Disp: , Rfl:     latanoprost (XALATAN) 0.005 % ophthalmic solution, Administer 1 drop to both eyes Daily., Disp: , Rfl:     methotrexate 2.5 MG tablet, 1 tablet. 10 pills once a week, Disp: , Rfl:     multivitamins-minerals (PRESERVISION AREDS 2) capsule capsule, Take 1 capsule by mouth 2 (Two) Times a Day., Disp: , Rfl:     prednisoLONE acetate (PRED FORTE) 1 % ophthalmic suspension, instill 1 drop into each eye twice daily, Disp: , Rfl:     rosuvastatin  "(CRESTOR) 5 MG tablet, TAKE 1 TABLET BY MOUTH ONCE DAILY, Disp: , Rfl:     tadalafil (CIALIS) 5 MG tablet, Take 1 tablet by mouth every night at bedtime., Disp: , Rfl:     midodrine (PROAMATINE) 5 MG tablet, Take 1 tablet by mouth 3 (Three) Times a Day Before Meals., Disp: 90 tablet, Rfl: 3  Current outpatient and discharge medications have been reconciled for the patient.  Reviewed by: Christophe Colón MD     Medications Discontinued During This Encounter   Medication Reason    benzonatate (TESSALON) 200 MG capsule *Therapy completed    clindamycin (Cleocin) 300 MG capsule *Therapy completed    doxycycline (VIBRAMYCIN) 100 MG capsule *Therapy completed    Multiple Vitamins-Minerals (PreserVision AREDS 2) chewable tablet *Therapy completed     Allergies   Allergen Reactions    Penicillins Other (See Comments)    Ciprofloxacin Rash    Sulfa Antibiotics Rash    Sulfamethoxazole-Trimethoprim Rash      Social History     Tobacco Use    Smoking status: Never     Passive exposure: Never    Smokeless tobacco: Never   Vaping Use    Vaping status: Never Used   Substance Use Topics    Alcohol use: Never    Drug use: Never     Family History   Problem Relation Age of Onset    Dementia Mother     Migraines Brother           Objective   /66 (BP Location: Right arm, Patient Position: Sitting, Cuff Size: Adult)   Pulse 73   Ht 177.8 cm (70\")   Wt 96.3 kg (212 lb 3.2 oz)   BMI 30.45 kg/m²     Wt Readings from Last 3 Encounters:   08/29/24 96.3 kg (212 lb 3.2 oz)   08/20/24 97.1 kg (214 lb)   08/17/24 97.3 kg (214 lb 8 oz)     BP Readings from Last 3 Encounters:   08/29/24 117/66   08/22/24 109/60   08/17/24 113/72       Physical Exam  Constitutional:  Awake. Not in acute distress. Normal appearance.   Neck: No carotid bruit, hepatojugular reflux or JVD.   Cardiovascular:      Rate and Rhythm: Normal rate and regular rhythm.      Chest Wall: PMI is not displaced.      Heart sounds: Normal heart sounds, S1 normal and S2 " "normal. No murmur heard.       No friction rub. No gallop. No S3 or S4 sounds.    Pulmonary: Pulmonary effort is normal. Normal breath sounds. No wheezing, rhonchi or rales.   Extremities: No Bilateral edema is noted.  Varicosities noted bilaterally  Skin: Warm and dry. Non cyanotic, No petechiae or rash.   Neurological: Alert and oriented x 3  Psychiatric:  Behavior is cooperative.       Result Review :   The following data was reviewed by Christophe Colón MD on 08/29/2024   No results found for: \"PROBNP\"       Lab Results   Component Value Date    TSH 2.120 06/25/2020      Lab Results   Component Value Date    FREET4 1.3 06/25/2020      No results found for: \"DDIMERQUANT\"  No results found for: \"MG\"   No results found for: \"DIGOXIN\"   No results found for: \"TROPONINT\"   No results found for: \"POCTROP\"                 No results found for this or any previous visit.        The ASCVD Risk score (Abimbola DK, et al., 2019) failed to calculate for the following reasons:    Cannot find a previous HDL lab    Cannot find a previous total cholesterol lab        Assessment  Assessment & Plan  1. Orthostatic hypotension likely from autonomic neuropathy  2. Syncope from orthostatic hypotension    His symptoms of lightheadedness and fainting are likely due to low blood pressure rather than a low heart rate. A brief high-grade block was observed, but it does not necessitate a pacemaker.   An echocardiogram will be ordered to further evaluate his cardiac function.   He is advised to continue wearing compression stockings and maintain adequate hydration.   Start with midodrine 2.5 mg p.o. 3 times daily on as-needed basis and if you are requiring it 3 times daily and still having hypotension you can increase dose to 5 mg p.o. 3 times daily on as-needed basis and then as a scheduled dosing.    3. Varicose veins from chronic venous disease.  He has significant varicose veins, which he manages with compression stockings. He should " continue using compression stockings to help alleviate symptoms.   If symptoms worsen or complications arise, further treatment options, including potential surgical interventions, will be considered.        Plan    ECG 12 Lead    Date/Time: 8/29/2024 3:18 PM  Performed by: Christophe Colón MD    Authorized by: Christophe Colón MD  Comparison: not compared with previous ECG   Rhythm: sinus rhythm  Ectopy: atrial premature contractions  QRS axis: left    Clinical impression: normal ECG                   Diagnoses and all orders for this visit:    1. Orthostatic hypotension (Primary)  -     Adult Transthoracic Echo Complete W/ Cont if Necessary Per Protocol; Future  -     midodrine (PROAMATINE) 5 MG tablet; Take 1 tablet by mouth 3 (Three) Times a Day Before Meals.  Dispense: 90 tablet; Refill: 3      Follow Up     Return in about 8 weeks (around 10/24/2024).      Christophe Colón MD  Interventional Cardiology  08/29/2024  15:11 EDT      Patient was given instructions and counseling regarding his condition or for health maintenance advice. Please see specific information pulled into the AVS if appropriate.     Please note that portions of this document were completed using a voice recognition program.     Patient or patient representative verbalized consent for the use of Ambient Listening during the visit with  Christophe Colón MD for chart documentation. 8/29/2024  15:11 EDT

## 2024-09-11 NOTE — PROGRESS NOTES
"Chief Complaint: Benign Prostatic Hypertrophy and chronic prostatitis    Subjective         History of Present Illness  José Lyles is a 69 y.o. male presents to [facility] to be seen for chronic prostatitis.    Patient presents reporting he had a prostate infection about 6 weeks ago. He reports the pain has been gone for a few days. Does occasionally have pain in his penis. He reports this is always his symptom with prostatitis. He has been off the antibiotics for about 2 weeks.     Frequency-only depends on how much he drinks- he drinks a lot of water per his reports    Urgency-denies    Incontinence-denies    Szrfuenh-1-2    Stream-\"ok\", hesitancy at times but feels he gets empty    GH-denies    History of stones-admits, passed     surgeries-denies    Family history of  malignancy-denies    Cardiopulmonary-CAD    Anticoagulants-ASA 81 mg    Smoker-denies    PSA  6/14/2024 0.41      Objective     Past Medical History:   Diagnosis Date    Arthritis     Bell palsy     Carotid artery occlusion     Coronary artery disease     Difficulty walking     Headache, tension-type     Kidney stones     Prostate infection     Sleep apnea     Uveitis     bilateral       Past Surgical History:   Procedure Laterality Date    COLONOSCOPY      EYE SURGERY      TONSILLECTOMY           Current Outpatient Medications:     aspirin 81 MG EC tablet, Take 1 tablet by mouth Daily., Disp: , Rfl:     Cholecalciferol 125 MCG (5000 UT) tablet, Vitamin D3 125 mcg (5,000 unit) oral tablet take 1 tablet by oral route daily   Active, Disp: , Rfl:     dorzolamide-timolol (COSOPT) 2-0.5 % ophthalmic solution, instill 1 drop into each eye twice daily, Disp: , Rfl:     Flarex 0.1 % ophthalmic suspension, Administer 1 drop to both eyes 3 (Three) Times a Day., Disp: , Rfl:     folic acid (FOLVITE) 1 MG tablet, Take 1 tablet by mouth Daily., Disp: , Rfl:     latanoprost (XALATAN) 0.005 % ophthalmic solution, Administer 1 " "drop to both eyes Daily., Disp: , Rfl:     methotrexate 2.5 MG tablet, 1 tablet. 10 pills once a week, Disp: , Rfl:     midodrine (PROAMATINE) 5 MG tablet, Take 1 tablet by mouth 3 (Three) Times a Day Before Meals., Disp: 90 tablet, Rfl: 3    multivitamins-minerals (PRESERVISION AREDS 2) capsule capsule, Take 1 capsule by mouth 2 (Two) Times a Day., Disp: , Rfl:     prednisoLONE acetate (PRED FORTE) 1 % ophthalmic suspension, instill 1 drop into each eye twice daily, Disp: , Rfl:     rosuvastatin (CRESTOR) 5 MG tablet, TAKE 1 TABLET BY MOUTH ONCE DAILY, Disp: , Rfl:     tadalafil (CIALIS) 5 MG tablet, Take 1 tablet by mouth every night at bedtime., Disp: , Rfl:     valACYclovir (VALTREX) 500 MG tablet, Take 1 tablet by mouth 3 times a day., Disp: , Rfl:     tamsulosin (FLOMAX) 0.4 MG capsule 24 hr capsule, Take 1 capsule by mouth Daily for 360 days., Disp: 30 capsule, Rfl: 11    Allergies   Allergen Reactions    Penicillins Other (See Comments)    Ciprofloxacin Rash    Sulfa Antibiotics Rash    Sulfamethoxazole-Trimethoprim Rash        Family History   Problem Relation Age of Onset    Dementia Mother     Migraines Brother        Social History     Socioeconomic History    Marital status:    Tobacco Use    Smoking status: Never     Passive exposure: Never    Smokeless tobacco: Never   Vaping Use    Vaping status: Never Used   Substance and Sexual Activity    Alcohol use: Never    Drug use: Never    Sexual activity: Defer     Partners: Female       Vital Signs:   /75 (BP Location: Right arm, Patient Position: Sitting, Cuff Size: Large Adult)   Pulse 76   Ht 177.8 cm (70\")   Wt 96.8 kg (213 lb 6.4 oz)   BMI 30.62 kg/m²      Physical Exam  Vitals reviewed.   Constitutional:       Appearance: Normal appearance.   Neurological:      General: No focal deficit present.      Mental Status: He is alert and oriented to person, place, and time.   Psychiatric:         Mood and Affect: Mood normal.         " Behavior: Behavior normal.          Result Review :   The following data was reviewed by: LUMA Cronin on 09/13/2024:      Bladder Scan interpretation 09/13/2024    Estimation of residual urine via BVI 3000 Verathon Bladder Scan  MA/nurse performing: Shilpa VOGT MA  Residual Urine: 0 ml  Indication: Benign prostatic hyperplasia with urinary frequency    Chronic prostatitis   Position: Supine  Examination: Incremental scanning of the suprapubic area using 2.0 MHz transducer using copious amounts of acoustic gel.   Findings: An anechoic area was demonstrated which represented the bladder, with measurement of residual urine as noted. I inspected this myself. In that the residual urine was stable or insignificant, refer to plan for treatment and plan necessary at this time.                Procedures        Assessment and Plan    Diagnoses and all orders for this visit:    1. Benign prostatic hyperplasia with urinary frequency (Primary)  -     Bladder Scan  -     tamsulosin (FLOMAX) 0.4 MG capsule 24 hr capsule; Take 1 capsule by mouth Daily for 360 days.  Dispense: 30 capsule; Refill: 11    2. Chronic prostatitis  -     Bladder Scan    Given his problems with recurrent prostatitis and his urinary symptoms, he would like to start on tamsulosin.  We did discuss side effect of dizziness.  We did discuss that he should take this at bedtime to try to decrease the risk.    I did advise if he has any symptoms of prostatitis that he can call and I will either work him in or send in an antibiotic.    I will plan to see him back in 3 months or sooner for new concerns.    Follow Up   Return in about 3 months (around 12/13/2024).  Patient was given instructions and counseling regarding his condition or for health maintenance advice. Please see specific information pulled into the AVS if appropriate.         This document has been electronically signed by LUMA Cronin  September 13, 2024 14:46 EDT

## 2024-09-13 ENCOUNTER — OFFICE VISIT (OUTPATIENT)
Dept: UROLOGY | Facility: CLINIC | Age: 69
End: 2024-09-13
Payer: MEDICARE

## 2024-09-13 VITALS
HEIGHT: 70 IN | BODY MASS INDEX: 30.55 KG/M2 | WEIGHT: 213.4 LBS | HEART RATE: 76 BPM | SYSTOLIC BLOOD PRESSURE: 145 MMHG | DIASTOLIC BLOOD PRESSURE: 75 MMHG

## 2024-09-13 DIAGNOSIS — N41.1 CHRONIC PROSTATITIS: ICD-10-CM

## 2024-09-13 DIAGNOSIS — N40.1 BENIGN PROSTATIC HYPERPLASIA WITH URINARY FREQUENCY: Primary | ICD-10-CM

## 2024-09-13 DIAGNOSIS — R35.0 BENIGN PROSTATIC HYPERPLASIA WITH URINARY FREQUENCY: Primary | ICD-10-CM

## 2024-09-13 LAB — URINE VOLUME: 0

## 2024-09-13 RX ORDER — FLUOROMETHOLONE ACETATE 1 MG/ML
1 SUSPENSION/ DROPS OPHTHALMIC 3 TIMES DAILY
COMMUNITY
Start: 2024-09-05

## 2024-09-13 RX ORDER — VALACYCLOVIR HYDROCHLORIDE 500 MG/1
1 TABLET, FILM COATED ORAL 3 TIMES DAILY
COMMUNITY
Start: 2024-09-11

## 2024-09-13 RX ORDER — TAMSULOSIN HYDROCHLORIDE 0.4 MG/1
1 CAPSULE ORAL DAILY
Qty: 30 CAPSULE | Refills: 11 | Status: SHIPPED | OUTPATIENT
Start: 2024-09-13 | End: 2025-09-08

## 2024-09-17 ENCOUNTER — HOSPITAL ENCOUNTER (OUTPATIENT)
Dept: CARDIOLOGY | Facility: HOSPITAL | Age: 69
Discharge: HOME | End: 2024-09-17
Admitting: STUDENT IN AN ORGANIZED HEALTH CARE EDUCATION/TRAINING PROGRAM
Payer: MEDICARE

## 2024-09-17 DIAGNOSIS — I95.1 ORTHOSTATIC HYPOTENSION: ICD-10-CM

## 2024-09-17 PROCEDURE — 93306 TTE W/DOPPLER COMPLETE: CPT

## 2024-09-18 ENCOUNTER — TELEPHONE (OUTPATIENT)
Dept: CARDIOLOGY | Facility: CLINIC | Age: 69
End: 2024-09-18
Payer: MEDICARE

## 2024-09-18 LAB
ASCENDING AORTA: 3.2 CM
BH CV ECHO MEAS - ACS: 1.68 CM
BH CV ECHO MEAS - AO MAX PG: 4.1 MMHG
BH CV ECHO MEAS - AO MEAN PG: 2.3 MMHG
BH CV ECHO MEAS - AO ROOT DIAM: 3.1 CM
BH CV ECHO MEAS - AO V2 MAX: 101.8 CM/SEC
BH CV ECHO MEAS - AO V2 VTI: 23.7 CM
BH CV ECHO MEAS - EDV(MOD-SP2): 105.5 ML
BH CV ECHO MEAS - EDV(MOD-SP4): 108.9 ML
BH CV ECHO MEAS - EF(MOD-BP): 55.6 %
BH CV ECHO MEAS - EF(MOD-SP2): 50.6 %
BH CV ECHO MEAS - EF(MOD-SP4): 55.6 %
BH CV ECHO MEAS - ESV(MOD-SP2): 52.1 ML
BH CV ECHO MEAS - ESV(MOD-SP4): 48.3 ML
BH CV ECHO MEAS - IVS/LVPW: 0.9 CM
BH CV ECHO MEAS - IVSD: 0.9 CM
BH CV ECHO MEAS - LA DIMENSION: 4.1 CM
BH CV ECHO MEAS - LAT PEAK E' VEL: 9.3 CM/SEC
BH CV ECHO MEAS - LV DIASTOLIC VOL/BSA (35-75): 50.8 CM2
BH CV ECHO MEAS - LV MAX PG: 3.2 MMHG
BH CV ECHO MEAS - LV MEAN PG: 1.4 MMHG
BH CV ECHO MEAS - LV SYSTOLIC VOL/BSA (12-30): 22.5 CM2
BH CV ECHO MEAS - LV V1 MAX: 90 CM/SEC
BH CV ECHO MEAS - LV V1 VTI: 19.9 CM
BH CV ECHO MEAS - LVIDD: 4.3 CM
BH CV ECHO MEAS - LVIDS: 2.2 CM
BH CV ECHO MEAS - LVOT DIAM: 2 CM
BH CV ECHO MEAS - LVPWD: 1 CM
BH CV ECHO MEAS - MED PEAK E' VEL: 8.9 CM/SEC
BH CV ECHO MEAS - MV A MAX VEL: 86.3 CM/SEC
BH CV ECHO MEAS - MV E MAX VEL: 56.3 CM/SEC
BH CV ECHO MEAS - MV E/A: 0.65
BH CV ECHO MEAS - MV MAX PG: 6 MMHG
BH CV ECHO MEAS - MV MEAN PG: 2.1 MMHG
BH CV ECHO MEAS - MV V2 VTI: 23.8 CM
BH CV ECHO MEAS - PA ACC TIME: 0.06 SEC
BH CV ECHO MEAS - PA V2 MAX: 120 CM/SEC
BH CV ECHO MEAS - PI END-D VEL: 104 CM/SEC
BH CV ECHO MEAS - RV MAX PG: 1 MMHG
BH CV ECHO MEAS - RV V1 MAX: 50 CM/SEC
BH CV ECHO MEAS - RV V1 VTI: 12.4 CM
BH CV ECHO MEAS - RVDD: 3.9 CM
BH CV ECHO MEAS - RVOT DIAM: 2.32 CM
BH CV ECHO MEAS - SV(MOD-SP2): 53.4 ML
BH CV ECHO MEAS - SV(MOD-SP4): 60.6 ML
BH CV ECHO MEAS - SV(RVOT): 52.4 ML
BH CV ECHO MEAS - SVI(MOD-SP2): 24.9 ML/M2
BH CV ECHO MEAS - SVI(MOD-SP4): 28.2 ML/M2
BH CV ECHO MEAS - TAPSE (>1.6): 3.5 CM
BH CV ECHO MEASUREMENTS AVERAGE E/E' RATIO: 6.19
BH CV XLRA - TDI S': 14 CM/SEC

## 2024-09-23 ENCOUNTER — TELEPHONE (OUTPATIENT)
Dept: UROLOGY | Facility: CLINIC | Age: 69
End: 2024-09-23
Payer: MEDICARE

## 2024-09-23 DIAGNOSIS — R35.0 BENIGN PROSTATIC HYPERPLASIA WITH URINARY FREQUENCY: ICD-10-CM

## 2024-09-23 DIAGNOSIS — N40.1 BENIGN PROSTATIC HYPERPLASIA WITH WEAK URINARY STREAM: ICD-10-CM

## 2024-09-23 DIAGNOSIS — R39.12 BENIGN PROSTATIC HYPERPLASIA WITH WEAK URINARY STREAM: ICD-10-CM

## 2024-09-23 DIAGNOSIS — N41.1 CHRONIC PROSTATITIS: Primary | ICD-10-CM

## 2024-09-23 DIAGNOSIS — N40.1 BENIGN PROSTATIC HYPERPLASIA WITH URINARY FREQUENCY: ICD-10-CM

## 2024-09-23 DIAGNOSIS — N30.00 ACUTE CYSTITIS WITHOUT HEMATURIA: ICD-10-CM

## 2024-09-24 ENCOUNTER — LAB (OUTPATIENT)
Dept: LAB | Facility: HOSPITAL | Age: 69
End: 2024-09-24
Payer: MEDICARE

## 2024-09-24 DIAGNOSIS — N30.00 ACUTE CYSTITIS WITHOUT HEMATURIA: ICD-10-CM

## 2024-09-24 PROCEDURE — 87086 URINE CULTURE/COLONY COUNT: CPT

## 2024-09-24 RX ORDER — FINASTERIDE 5 MG/1
5 TABLET, FILM COATED ORAL DAILY
Qty: 90 TABLET | Refills: 3 | Status: SHIPPED | OUTPATIENT
Start: 2024-09-24

## 2024-09-25 ENCOUNTER — TELEPHONE (OUTPATIENT)
Dept: UROLOGY | Facility: CLINIC | Age: 69
End: 2024-09-25
Payer: MEDICARE

## 2024-09-26 LAB — BACTERIA SPEC AEROBE CULT: NO GROWTH

## 2024-10-06 ENCOUNTER — APPOINTMENT (OUTPATIENT)
Dept: CT IMAGING | Facility: HOSPITAL | Age: 69
End: 2024-10-06
Payer: MEDICARE

## 2024-10-06 ENCOUNTER — HOSPITAL ENCOUNTER (EMERGENCY)
Facility: HOSPITAL | Age: 69
Discharge: HOME | End: 2024-10-06
Attending: EMERGENCY MEDICINE | Admitting: EMERGENCY MEDICINE
Payer: MEDICARE

## 2024-10-06 VITALS
WEIGHT: 209.22 LBS | RESPIRATION RATE: 18 BRPM | SYSTOLIC BLOOD PRESSURE: 141 MMHG | OXYGEN SATURATION: 98 % | HEIGHT: 70 IN | TEMPERATURE: 99.1 F | HEART RATE: 78 BPM | DIASTOLIC BLOOD PRESSURE: 81 MMHG | BODY MASS INDEX: 29.95 KG/M2

## 2024-10-06 DIAGNOSIS — N48.89 PENILE PAIN: Primary | ICD-10-CM

## 2024-10-06 LAB
ALBUMIN SERPL-MCNC: 3.9 G/DL (ref 3.5–5.2)
ALBUMIN/GLOB SERPL: 1.5 G/DL
ALP SERPL-CCNC: 73 U/L (ref 39–117)
ALT SERPL W P-5'-P-CCNC: 14 U/L (ref 1–41)
ANION GAP SERPL CALCULATED.3IONS-SCNC: 9.1 MMOL/L (ref 5–15)
AST SERPL-CCNC: 19 U/L (ref 1–40)
BASOPHILS # BLD AUTO: 0.08 10*3/MM3 (ref 0–0.2)
BASOPHILS NFR BLD AUTO: 1 % (ref 0–1.5)
BILIRUB SERPL-MCNC: 0.4 MG/DL (ref 0–1.2)
BILIRUB UR QL STRIP: NEGATIVE
BUN SERPL-MCNC: 21 MG/DL (ref 8–23)
BUN/CREAT SERPL: 17.6 (ref 7–25)
CALCIUM SPEC-SCNC: 9.4 MG/DL (ref 8.6–10.5)
CHLORIDE SERPL-SCNC: 107 MMOL/L (ref 98–107)
CLARITY UR: CLEAR
CO2 SERPL-SCNC: 23.9 MMOL/L (ref 22–29)
COLOR UR: YELLOW
CREAT SERPL-MCNC: 1.19 MG/DL (ref 0.76–1.27)
D-LACTATE SERPL-SCNC: 1.2 MMOL/L (ref 0.5–2)
DEPRECATED RDW RBC AUTO: 49.6 FL (ref 37–54)
EGFRCR SERPLBLD CKD-EPI 2021: 65.3 ML/MIN/1.73
EOSINOPHIL # BLD AUTO: 0.27 10*3/MM3 (ref 0–0.4)
EOSINOPHIL NFR BLD AUTO: 3.4 % (ref 0.3–6.2)
ERYTHROCYTE [DISTWIDTH] IN BLOOD BY AUTOMATED COUNT: 14.7 % (ref 12.3–15.4)
GLOBULIN UR ELPH-MCNC: 2.6 GM/DL
GLUCOSE SERPL-MCNC: 170 MG/DL (ref 65–99)
GLUCOSE UR STRIP-MCNC: NEGATIVE MG/DL
HCT VFR BLD AUTO: 36.8 % (ref 37.5–51)
HGB BLD-MCNC: 12.7 G/DL (ref 13–17.7)
HGB UR QL STRIP.AUTO: NEGATIVE
HOLD SPECIMEN: NORMAL
HOLD SPECIMEN: NORMAL
IMM GRANULOCYTES # BLD AUTO: 0.03 10*3/MM3 (ref 0–0.05)
IMM GRANULOCYTES NFR BLD AUTO: 0.4 % (ref 0–0.5)
KETONES UR QL STRIP: NEGATIVE
LEUKOCYTE ESTERASE UR QL STRIP.AUTO: NEGATIVE
LIPASE SERPL-CCNC: 73 U/L (ref 13–60)
LYMPHOCYTES # BLD AUTO: 1.9 10*3/MM3 (ref 0.7–3.1)
LYMPHOCYTES NFR BLD AUTO: 23.6 % (ref 19.6–45.3)
MCH RBC QN AUTO: 32.2 PG (ref 26.6–33)
MCHC RBC AUTO-ENTMCNC: 34.5 G/DL (ref 31.5–35.7)
MCV RBC AUTO: 93.4 FL (ref 79–97)
MONOCYTES # BLD AUTO: 0.89 10*3/MM3 (ref 0.1–0.9)
MONOCYTES NFR BLD AUTO: 11.1 % (ref 5–12)
NEUTROPHILS NFR BLD AUTO: 4.87 10*3/MM3 (ref 1.7–7)
NEUTROPHILS NFR BLD AUTO: 60.5 % (ref 42.7–76)
NITRITE UR QL STRIP: NEGATIVE
NRBC BLD AUTO-RTO: 0 /100 WBC (ref 0–0.2)
PH UR STRIP.AUTO: 5.5 [PH] (ref 5–8)
PLATELET # BLD AUTO: 199 10*3/MM3 (ref 140–450)
PMV BLD AUTO: 10.4 FL (ref 6–12)
POTASSIUM SERPL-SCNC: 4.1 MMOL/L (ref 3.5–5.2)
PROT SERPL-MCNC: 6.5 G/DL (ref 6–8.5)
PROT UR QL STRIP: ABNORMAL
RBC # BLD AUTO: 3.94 10*6/MM3 (ref 4.14–5.8)
SODIUM SERPL-SCNC: 140 MMOL/L (ref 136–145)
SP GR UR STRIP: 1.02 (ref 1–1.03)
UROBILINOGEN UR QL STRIP: ABNORMAL
WBC NRBC COR # BLD AUTO: 8.04 10*3/MM3 (ref 3.4–10.8)
WHOLE BLOOD HOLD COAG: NORMAL
WHOLE BLOOD HOLD SPECIMEN: NORMAL

## 2024-10-06 PROCEDURE — 81003 URINALYSIS AUTO W/O SCOPE: CPT | Performed by: EMERGENCY MEDICINE

## 2024-10-06 PROCEDURE — 85025 COMPLETE CBC W/AUTO DIFF WBC: CPT

## 2024-10-06 PROCEDURE — 36415 COLL VENOUS BLD VENIPUNCTURE: CPT

## 2024-10-06 PROCEDURE — 99285 EMERGENCY DEPT VISIT HI MDM: CPT

## 2024-10-06 PROCEDURE — 74177 CT ABD & PELVIS W/CONTRAST: CPT

## 2024-10-06 PROCEDURE — 83690 ASSAY OF LIPASE: CPT

## 2024-10-06 PROCEDURE — 83605 ASSAY OF LACTIC ACID: CPT

## 2024-10-06 PROCEDURE — 25510000001 IOPAMIDOL PER 1 ML: Performed by: EMERGENCY MEDICINE

## 2024-10-06 PROCEDURE — 80053 COMPREHEN METABOLIC PANEL: CPT

## 2024-10-06 RX ORDER — IOPAMIDOL 755 MG/ML
100 INJECTION, SOLUTION INTRAVASCULAR
Status: COMPLETED | OUTPATIENT
Start: 2024-10-06 | End: 2024-10-06

## 2024-10-06 RX ORDER — SODIUM CHLORIDE 0.9 % (FLUSH) 0.9 %
10 SYRINGE (ML) INJECTION AS NEEDED
Status: DISCONTINUED | OUTPATIENT
Start: 2024-10-06 | End: 2024-10-07 | Stop reason: HOSPADM

## 2024-10-06 RX ADMIN — IOPAMIDOL 100 ML: 755 INJECTION, SOLUTION INTRAVENOUS at 19:10

## 2024-10-06 NOTE — ED PROVIDER NOTES
Time: 6:54 PM EDT  Date of encounter:  10/6/2024  Independent Historian/Clinical History and Information was obtained by:   Patient and wife  Chief Complaint: Penile pain    History is limited by: N/A    History of Present Illness:  Patient is a 69 y.o. year old male who presents to the emergency department for evaluation of penile pain.  Patient initially reports the pain began 1-1/2 months ago.  Patient complains of pain within the shaft of his penis.  Patient reports the pain to be constant.  Patient denies any exacerbating or alleviating factors.  Patient denies any skin lesions or wounds.  Patient does report increased urinary frequency.  Patient states that for this pain he has seen multiple providers.  He also reports that his primary care provider has put him on 310-day rounds of doxycycline.  He reports that after that he went to urgent care and they put him on clindamycin but he had to stop after couple days due to vomiting.  He reports he then saw urologist to put him on a prostate medication.  He had to stop the Flomax after 2 days due to hypotension and the second medication he did not even start because the side effects talk about dry eyes any already has uveitis with dry eyes.  He has not let his urologist know that he has not taken the second medication.  As I was getting ready to leave the room I summarized his history and now he tells me that the history is not quite accurate.  He states that the antibiotics were given months ago not within the last 45 days.  Patient says last episode of doxycycline was back sometime at the end of May 1 June.  Patient states that he had the same pain now that he had back in May or June.  Upon additional questioning once again he then now tells me he has actually had the same pain going back as far as 15 years ago when he saw Dr. Martinez.  Patient states that when he had this pain before Dr. Martinez diagnosed with prostatitis and put him on various courses of  antibiotics.  In regards to the 3 doses of doxycycline.  I was never able to determine when each of the 3 doses were but he states he has not been on any antibiotics since the end of May/first of June.    HPI    Patient Care Team  Primary Care Provider: Jael Guido APRN    Past Medical History:     Allergies   Allergen Reactions    Penicillins Swelling     Had as child. Swollen lips.     Trimethoprim Rash    Ciprofloxacin Rash    Sulfa Antibiotics Rash    Sulfamethoxazole-Trimethoprim Rash     Past Medical History:   Diagnosis Date    Arthritis     Bell palsy     Carotid artery occlusion     Coronary artery disease     Difficulty walking     Headache, tension-type     Kidney stones     Prostate infection     Sleep apnea     Uveitis     bilateral     Past Surgical History:   Procedure Laterality Date    COLONOSCOPY      EYE SURGERY      TONSILLECTOMY       Family History   Problem Relation Age of Onset    Dementia Mother     Migraines Brother        Home Medications:  Prior to Admission medications    Medication Sig Start Date End Date Taking? Authorizing Provider   aspirin 81 MG EC tablet Take 1 tablet by mouth Daily.   Yes Mayra Talbert MD   Cholecalciferol 125 MCG (5000 UT) tablet Vitamin D3 125 mcg (5,000 unit) oral tablet take 1 tablet by oral route daily   Active   Yes Mayra Talbert MD   dorzolamide-timolol (COSOPT) 2-0.5 % ophthalmic solution instill 1 drop into each eye twice daily 6/9/24  Yes Mayra Talbert MD   Flarex 0.1 % ophthalmic suspension Administer 1 drop to both eyes 3 (Three) Times a Day. 9/5/24  Yes Mayra Talbert MD   folic acid (FOLVITE) 1 MG tablet Take 1 tablet by mouth Daily. 5/13/24  Yes Mayra Talbert MD   latanoprost (XALATAN) 0.005 % ophthalmic solution Administer 1 drop to both eyes Daily. 8/26/24  Yes Mayra Talbert MD   methotrexate 2.5 MG tablet 1 tablet. 10 pills once a week 6/26/24  Yes Mayra Talbert MD   midodrine  (PROAMATINE) 5 MG tablet Take 1 tablet by mouth 3 (Three) Times a Day Before Meals. 8/29/24  Yes Christophe Colón MD   multivitamins-minerals (PRESERVISION AREDS 2) capsule capsule Take 1 capsule by mouth 2 (Two) Times a Day.   Yes Mayra Talbert MD   prednisoLONE acetate (PRED FORTE) 1 % ophthalmic suspension instill 1 drop into each eye twice daily 6/22/24  Yes Mayra Talbert MD   rosuvastatin (CRESTOR) 5 MG tablet TAKE 1 TABLET BY MOUTH ONCE DAILY 9/20/20  Yes Mayra Talbert MD   tadalafil (CIALIS) 5 MG tablet Take 1 tablet by mouth every night at bedtime. 4/29/24  Yes Mayra Talbert MD   finasteride (PROSCAR) 5 MG tablet Take 1 tablet by mouth Daily. 9/24/24 10/6/24  Lydia Monterroso APRN   tamsulosin (FLOMAX) 0.4 MG capsule 24 hr capsule Take 1 capsule by mouth Daily for 360 days. 9/13/24 10/6/24  Lydia Monterroso APRN   valACYclovir (VALTREX) 500 MG tablet Take 1 tablet by mouth 3 times a day. 9/11/24 10/6/24  ProviderMayra MD        Social History:   Social History     Tobacco Use    Smoking status: Never     Passive exposure: Never    Smokeless tobacco: Never   Vaping Use    Vaping status: Never Used   Substance Use Topics    Alcohol use: Never    Drug use: Never         Review of Systems:  Review of Systems   Constitutional:  Negative for chills and fever.   HENT:  Negative for congestion, ear pain and sore throat.    Eyes:  Negative for pain.   Respiratory:  Negative for cough, chest tightness and shortness of breath.    Cardiovascular:  Negative for chest pain.   Gastrointestinal:  Negative for abdominal pain, diarrhea, nausea and vomiting.   Genitourinary:  Positive for penile pain. Negative for flank pain and hematuria.   Musculoskeletal:  Negative for joint swelling.   Skin:  Negative for pallor.   Neurological:  Negative for seizures and headaches.   All other systems reviewed and are negative.       Physical Exam:  /81 (BP Location: Right arm,  "Patient Position: Lying)   Pulse 78   Temp 99.1 °F (37.3 °C) (Oral)   Resp 18   Ht 177.8 cm (70\")   Wt 94.9 kg (209 lb 3.5 oz)   SpO2 98%   BMI 30.02 kg/m²     Physical Exam    Vital signs were reviewed under triage note.  General appearance - Patient appears well-developed and well-nourished.  Patient is in no acute distress.  Head - Normocephalic, atraumatic.  Pupils - Equal, round, reactive to light.  Extraocular muscles are intact.  Conjunctiva is clear.  Nasal - Normal inspection.  No evidence of trauma or epistaxis.  Tympanic membranes - Gray, intact without erythema or retractions.  Oral mucosa - Pink and moist without lesions or erythema.  Uvula is midline.  Chest wall - Atraumatic.  Chest wall is nontender.  There are no vesicular rashes noted.  Neck - Supple.  Trachea was midline.  There is no palpable lymphadenopathy or thyromegaly.  There are no meningeal signs  Lungs - Clear to auscultation and percussion bilaterally.  Heart - Regular rate and rhythm without any murmurs, clicks, or gallops.  Abdomen - Soft.  Bowel sounds are present.  There is no palpable tenderness.  There is no rebound, guarding, or rigidity.  There are no palpable masses.  There are no pulsatile masses.  Genitourinary - The patient has a circumcised phalanx.  There are no penile shaft lesions.  There are no signs of urethral discharge.  Testes are bilaterally descended and nontender.  Back - Spine is straight and midline.  There is no CVA tenderness.  Extremities - Intact x4 with full range of motion.  There is no palpable edema.  Pulses are intact x4 and equal.  Neurologic - Patient is awake, alert, and oriented x3.  Cranial nerves II through XII are grossly intact.  Motor and sensory functions grossly intact.  Cerebellar function was normal.  Integument - There are no rashes.  There are no petechia or purpura lesions noted.  There are no vesicular lesions noted.           Procedures:  Procedures      Medical Decision " Making:      Comorbidities that affect care:    Bell's palsy, sleep apnea, kidney stones, coronary artery disease, carotid artery disease, uveitis, interstitial cystitis, hypertension, hyperlipidemia    External Notes reviewed:    Previous Clinic Note: Urgent care note from earlier today with Dr. Rodriguez was reviewed by me.      The following orders were placed and all results were independently analyzed by me:  Orders Placed This Encounter   Procedures    CT Abdomen Pelvis With Contrast    [place] Draw    Comprehensive Metabolic Panel    Lipase    Urinalysis With Microscopic If Indicated (No Culture) - Urine, Clean Catch    Lactic Acid, Plasma    CBC Auto Differential    NPO Diet NPO Type: Strict NPO    Undress & Gown    Insert Peripheral IV    CBC & Differential    Green Top (Gel)    Lavender Top    Gold Top - SST    Light Blue Top       Medications Given in the Emergency Department:  Medications   sodium chloride 0.9 % flush 10 mL (has no administration in time range)   iopamidol (ISOVUE-370) 76 % injection 100 mL (100 mL Intravenous Given 10/6/24 1910)        ED Course:     The patient was seen and evaluated the ED by me.  The above history and physical examination was performed as documented.  Diagnostic data was obtained.  Results reviewed.  Findings were discussed with the patient and his wife.  At this point, do not see any acute findings from an ER standpoint.  I raised the question of the patient would need or benefit from a cystoscopy to see if there are some urethral pathology that could be identified does not seen on CT scan.  I recommend patient follow-up with his urologist.  I explained to the patient that at this point with only symptom is penile shaft pain that urologist is a specialist for this organ and thus will need to follow-up with them for an answer.    Labs:    Lab Results (last 24 hours)       Procedure Component Value Units Date/Time    POC Urinalysis Dipstick, Multipro (All Except Ovi  [facility] [[email or web address]]) [[id number]]  (Normal) Collected: 10/06/24 1506    Specimen: Urine Updated: 10/06/24 1507     Color Yellow     Clarity, UA Clear     Glucose, UA Negative mg/dL      Bilirubin Negative     Ketones, UA Negative     Specific Gravity  1.015     Blood, UA Negative     pH, Urine 6.0     Protein, POC Negative mg/dL      Urobilinogen, UA Normal     Nitrite, UA Negative     Leukocytes Negative    CBC & Differential [[id number]]  (Abnormal) Collected: 10/06/24 1648    Specimen: Blood from Arm, Right Updated: 10/06/24 1655    Narrative:      The following orders were created for panel order CBC & Differential.  Procedure                               Abnormality         Status                     ---------                               -----------         ------                     CBC Auto Differential[[id number]]        Abnormal            Final result                 Please view results for these tests on the individual orders.    Comprehensive Metabolic Panel [[id number]]  (Abnormal) Collected: 10/06/24 1648    Specimen: Blood from Arm, Right Updated: 10/06/24 1720     Glucose 170 mg/dL      BUN 21 mg/dL      Creatinine 1.19 mg/dL      Sodium 140 mmol/L      Potassium 4.1 mmol/L      Chloride 107 mmol/L      CO2 23.9 mmol/L      Calcium 9.4 mg/dL      Total Protein 6.5 g/dL      Albumin 3.9 g/dL      ALT (SGPT) 14 U/L      AST (SGOT) 19 U/L      Alkaline Phosphatase 73 U/L      Total Bilirubin 0.4 mg/dL      Globulin 2.6 gm/dL      A/G Ratio 1.5 g/dL      BUN/Creatinine Ratio 17.6     Anion Gap 9.1 mmol/L      eGFR 65.3 mL/min/1.73     Narrative:      GFR Normal >60  Chronic Kidney Disease <60  Kidney Failure <15    The GFR formula is only valid for adults with stable renal function between ages 18 and 70.    Lipase [[id number]]  (Abnormal) Collected: 10/06/24 1648    Specimen: Blood from Arm, Right Updated: 10/06/24 1720     Lipase 73 U/L     Lactic Acid, Plasma [[id number]]  (Normal) Collected: 10/06/24 1648    Specimen:  Blood from Arm, Right Updated: 10/06/24 1716     Lactate 1.2 mmol/L     CBC Auto Differential [[id number]]  (Abnormal) Collected: 10/06/24 1648    Specimen: Blood from Arm, Right Updated: 10/06/24 1655     WBC 8.04 10*3/mm3      RBC 3.94 10*6/mm3      Hemoglobin 12.7 g/dL      Hematocrit 36.8 %      MCV 93.4 fL      MCH 32.2 pg      MCHC 34.5 g/dL      RDW 14.7 %      RDW-SD 49.6 fl      MPV 10.4 fL      Platelets 199 10*3/mm3      Neutrophil % 60.5 %      Lymphocyte % 23.6 %      Monocyte % 11.1 %      Eosinophil % 3.4 %      Basophil % 1.0 %      Immature Grans % 0.4 %      Neutrophils, Absolute 4.87 10*3/mm3      Lymphocytes, Absolute 1.90 10*3/mm3      Monocytes, Absolute 0.89 10*3/mm3      Eosinophils, Absolute 0.27 10*3/mm3      Basophils, Absolute 0.08 10*3/mm3      Immature Grans, Absolute 0.03 10*3/mm3      nRBC 0.0 /100 WBC     Urinalysis With Microscopic If Indicated (No Culture) - Urine, Clean Catch [[id number]]  (Abnormal) Collected: 10/06/24 1805    Specimen: Urine, Clean Catch Updated: 10/06/24 1819     Color, UA Yellow     Appearance, UA Clear     pH, UA 5.5     Specific Gravity, UA 1.020     Glucose, UA Negative     Ketones, UA Negative     Bilirubin, UA Negative     Blood, UA Negative     Protein, UA Trace     Leuk Esterase, UA Negative     Nitrite, UA Negative     Urobilinogen, UA 1.0 E.U./dL    Narrative:      Urine microscopic not indicated.             Imaging:    CT Abdomen Pelvis With Contrast    Result Date: 10/6/2024  CT ABDOMEN PELVIS W CONTRAST Date of exam: 10/6/2024, 7:03 P.M., EDT. Indications: Pelvic pain (for the past 3 weeks, worsening today). Comparison: None available. TECHNIQUE: Axial CT images were obtained of the abdomen and pelvis after the uneventful intravenous administration of 70 mL Isovue-370 nonionic iodinated contrast agent. Reconstructed 2D coronal and sagittal images were also obtained. Automated exposure control and  iterative construction methods were used. No oral  contrast agent was administered for the study. The exact intravenous contrast dose and the radiation dose are documented in the electronic medical record, EMR ([facility]).  FINDINGS: There are small bilateral inguinal hernias, which contain fat and vessels and no bowel, larger on the left than the right. Prostatic calcifications are seen. There is mild prostatic enlargement. No urinary bladder wall thickening or urinary bladder calculi. Arterial calcifications are seen. No aneurysmal dilatation of the abdominal aorta. No acute intraperitoneal or retroperitoneal hemorrhage. Right-sided nonobstructing renal calyceal stones are seen, which measure about 6 mm or less. Renal cortical scarring is present bilaterally. There is tiny renal cysts. No hydronephrosis or ureterolithiasis. No definite acute pyelonephritis. There may be small eventrations versus herniations in the posterior aspect of the diaphragm bilaterally. These findings contain fat. The appendix is not clearly identified. It may be surgically absent. Degenerative changes are seen throughout the imaged spine. There is mild lateral curvature of the lumbar spine with the convexity to the left, which may be fixed or positional in nature. There may be diffuse idiopathic skeletal hyperostosis (DISH). Degenerative changes involve the hip joints and the bilateral sacroiliac joints. No acute fracture. No aggressive osseous lesion is suggested. 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 Otherwise, no acute findings are seen.     No acute findings are appreciated by enhanced CT examination of the abdomen and pelvis. Please see above comments for further detail.    Portions of this note were completed with a voice recognition program. Electronically Signed: Harpal Payne MD  10/6/2024 7:20  PM EDT  Workstation ID: [id number]       Differential Diagnosis and Discussion:    Dysuria: Differential diagnosis includes but is not limited to urethritis, cystitis, pyelonephritis, ureteral calculi, neoplasm, chemical irritant, urethral stricture, and trauma    All labs were reviewed and interpreted by me.  CT scan radiology impression was interpreted by me.    MDM     Amount and/or Complexity of Data Reviewed  Clinical lab tests: reviewed  Tests in the radiology section of CPT®: reviewed             Patient Care Considerations:    ANTIBIOTICS: I considered prescribing antibiotics as an outpatient however no bacterial focus of infection was found.      Consultants/Shared Management Plan:    None    Social Determinants of Health:    Patient is independent, reliable, and has access to care.       Disposition and Care Coordination:    Discharged: The patient is suitable and stable for discharge with no need for consideration of admission.    I have explained the patient´s condition, diagnoses and treatment plan based on the information available to me at this time. I have answered questions and addressed any concerns. The patient has a good  understanding of the patient´s diagnosis, condition, and treatment plan as can be expected at this point. The vital signs have been stable. The patient´s condition is stable and appropriate for discharge from the emergency department.      The patient will pursue further outpatient evaluation with the primary care physician or other designated or consulting physician as outlined in the discharge instructions. They are agreeable to this plan of care and follow-up instructions have been explained in detail. The patient has received these instructions in written format and has expressed an understanding of the discharge instructions. The patient is aware that any significant change in condition or worsening of symptoms should prompt an immediate return to this or the closest  emergency department or call to 911.    Final diagnoses:   Penile pain        ED Disposition       ED Disposition   Discharge    Condition   Stable    Comment   --               This medical record created using voice recognition software.             Nelson Bowser DO  10/09/24 [id number]

## 2024-10-07 ENCOUNTER — TELEPHONE (OUTPATIENT)
Dept: UROLOGY | Facility: CLINIC | Age: 69
End: 2024-10-07
Payer: MEDICARE

## 2024-10-07 NOTE — DISCHARGE INSTRUCTIONS
Call your urologist office tomorrow to schedule follow-up as soon as possible.  Discussed with them your ongoing symptoms to determine treatment course.  Continue other home medications as previously directed.  Return to the ER for fever greater than 101, blood in your urine or bleeding from your urethra, or any other concerns issues that may arise

## 2024-10-07 NOTE — TELEPHONE ENCOUNTER
Provider: EDGAR CARBAJAL    Caller: DEE SALVADOR    Relationship to Patient: SPOUSE    Reason for Call: PATIENT SEEN AT  ED YESTERDAY FOR PENIS PAIN. NEW DX. DC PAPERWORK STATES SEE EDGAR CARBAJAL ASAP. PLEASE ADVISE ON SCHEDULING.    BEST NUMBER IS [phone]

## 2024-10-07 NOTE — PROGRESS NOTES
Chief Complaint: No chief complaint on file.    Subjective         History of Present Illness  José Lyles is a 69 y.o. male presents to [facility] to be seen for follow-up.    Patient was previously seen by me with last visit on 9/13/2024 for chronic prostatitis/BPH.  At that visit I did start him on tamsulosin but he did call back and state that he could not tolerate this due to dizziness.  He was also given a prescription for finasteride which he reported he did not take.  He was seen in the emergency department on 10/6/2024 complaining of penile pain.  At his previous visit with me he did report that that was his symptom when he would get prostatitis.  He is here for further evaluation.    He has had intermittent penile pain since his last visit. He reports his groin now has pain and right testicular pain.     He reports the pain is in the head of the penis and is  achy. He does not feel any changes or bumps in the penis. He does not notice any change in the penis with erections- no curvature noted.     He was previously seen by Dr. Martinez. He reports he can't remember if the pain he was having then was similar to the current pain.  He does have a history of interstitial cystitis and has been attempting to change his diet.    CT ABDOMEN PELVIS W CONTRAST     Date of exam: 10/6/2024, 7:03 P.M., EDT.     Indications: Pelvic pain (for the past 3 weeks, worsening today).     Comparison: None available.       FINDINGS:  There are small bilateral inguinal hernias, which contain fat and vessels and no bowel, larger on the left than the right. Prostatic calcifications are seen. There is mild prostatic enlargement. No urinary bladder wall thickening or urinary bladder   calculi. Arterial calcifications are seen. No aneurysmal dilatation of the abdominal aorta. No acute intraperitoneal or retroperitoneal hemorrhage. Right-sided nonobstructing renal calyceal stones are seen, which measure about 6  "mm or less. Renal   cortical scarring is present bilaterally. There is tiny renal cysts. No hydronephrosis or ureterolithiasis. No definite acute pyelonephritis. There may be small eventrations versus herniations in the posterior aspect of the diaphragm bilaterally. These   findings contain fat. The appendix is not clearly identified. It may be surgically absent. Degenerative changes are seen throughout the imaged spine. There is mild lateral curvature of the lumbar spine with the convexity to the left, which may be fixed   or positional in nature. There may be diffuse idiopathic skeletal hyperostosis (DISH). Degenerative changes involve the hip joints and the bilateral sacroiliac joints. No acute fracture. No aggressive osseous lesion is suggested. There is a suspected   benign intraosseous hemangioma (or venous malformation), measuring nearly 4 cm, involving the left posterior aspect of the T10 vertebral body, such as seen on image 8 of series 201 and image 112 of series 203. Otherwise, no acute findings are seen.        IMPRESSION:  No acute findings are appreciated by enhanced CT examination of the abdomen and pelvis. Please see above comments for further detail.            Portions of this note were completed with a voice recognition program.     Electronically Signed: Harpal Payne MD    10/6/2024 7:20 PM EDT     Previous 9/13/2024:  Patient presents reporting he had a prostate infection about 6 weeks ago. He reports the pain has been gone for a few days. Does occasionally have pain in his penis. He reports this is always his symptom with prostatitis. He has been off the antibiotics for about 2 weeks.      Frequency-only depends on how much he drinks- he drinks a lot of water per his reports   Urgency-denies   Incontinence-denies   Hefvbtly-7-2   Stream-\"ok\", hesitancy at times but feels he gets empty   GH-denies   History of stones-admits, passed    surgeries-denies   Family history of  " malignancy-denies   Cardiopulmonary-CAD   Anticoagulants-ASA 81 mg   Smoker-denies     PSA  6/14/2024 0.41       Objective     Past Medical History:   Diagnosis Date    Arthritis     Bell palsy     Carotid artery occlusion     Coronary artery disease     Difficulty walking     Headache, tension-type     Kidney stones     Prostate infection     Sleep apnea     Uveitis     bilateral       Past Surgical History:   Procedure Laterality Date    COLONOSCOPY      EYE SURGERY      TONSILLECTOMY           Current Outpatient Medications:     aspirin 81 MG EC tablet, Take 1 tablet by mouth Daily., Disp: , Rfl:     Cholecalciferol 125 MCG (5000 UT) tablet, Vitamin D3 125 mcg (5,000 unit) oral tablet take 1 tablet by oral route daily   Active, Disp: , Rfl:     dorzolamide-timolol (COSOPT) 2-0.5 % ophthalmic solution, instill 1 drop into each eye twice daily, Disp: , Rfl:     Flarex 0.1 % ophthalmic suspension, Administer 1 drop to both eyes 3 (Three) Times a Day., Disp: , Rfl:     folic acid (FOLVITE) 1 MG tablet, Take 1 tablet by mouth Daily., Disp: , Rfl:     latanoprost (XALATAN) 0.005 % ophthalmic solution, Administer 1 drop to both eyes Daily., Disp: , Rfl:     methotrexate 2.5 MG tablet, 1 tablet. 10 pills once a week, Disp: , Rfl:     midodrine (PROAMATINE) 5 MG tablet, Take 1 tablet by mouth 3 (Three) Times a Day Before Meals., Disp: 90 tablet, Rfl: 3    multivitamins-minerals (PRESERVISION AREDS 2) capsule capsule, Take 1 capsule by mouth 2 (Two) Times a Day., Disp: , Rfl:     prednisoLONE acetate (PRED FORTE) 1 % ophthalmic suspension, instill 1 drop into each eye twice daily, Disp: , Rfl:     rosuvastatin (CRESTOR) 5 MG tablet, TAKE 1 TABLET BY MOUTH ONCE DAILY, Disp: , Rfl:     tadalafil (CIALIS) 5 MG tablet, Take 1 tablet by mouth every night at bedtime., Disp: , Rfl:     doxycycline (VIBRAMYCIN) 100 MG capsule, Take 1 capsule by mouth 2 (Two) Times a Day for 28 days., Disp: 56 capsule, Rfl: 0    Lactobacillus  "(Florajen Acidophilus) capsule, Take 1 capsule by mouth Daily., Disp: 28 capsule, Rfl: 0    Allergies   Allergen Reactions    Penicillins Swelling     Had as child. Swollen lips.     Trimethoprim Rash    Ciprofloxacin Rash    Sulfa Antibiotics Rash    Sulfamethoxazole-Trimethoprim Rash        Family History   Problem Relation Age of Onset    Dementia Mother     Migraines Brother        Social History     Socioeconomic History    Marital status:    Tobacco Use    Smoking status: Never     Passive exposure: Never    Smokeless tobacco: Never   Vaping Use    Vaping status: Never Used   Substance and Sexual Activity    Alcohol use: Never    Drug use: Never    Sexual activity: Defer     Partners: Female       Vital Signs:   /72 (BP Location: Right arm, Patient Position: Sitting, Cuff Size: Large Adult)   Pulse 70   Ht 177.8 cm (70\")   Wt 94.9 kg (209 lb 3.5 oz)   BMI 30.02 kg/m²      Physical Exam  Vitals reviewed.   Constitutional:       Appearance: Normal appearance.   Genitourinary:     Comments: No masses felt throughout the head of the penis where he reports the tenderness.  No obvious changes to the skin.    He does have mild right posterior testicular tenderness upon palpation.  Neurological:      General: No focal deficit present.      Mental Status: He is alert and oriented to person, place, and time.   Psychiatric:         Mood and Affect: Mood normal.         Behavior: Behavior normal.          Result Review :   The following data was reviewed by: LUMA Cronin on 10/08/2024:  Results for orders placed or performed during the hospital encounter of 10/06/24   Comprehensive Metabolic Panel    Specimen: Arm, Right; Blood   Result Value Ref Range    Glucose 170 (H) 65 - 99 mg/dL    BUN 21 8 - 23 mg/dL    Creatinine 1.19 0.76 - 1.27 mg/dL    Sodium 140 136 - 145 mmol/L    Potassium 4.1 3.5 - 5.2 mmol/L    Chloride 107 98 - 107 mmol/L    CO2 23.9 22.0 - 29.0 mmol/L    Calcium 9.4 8.6 - " 10.5 mg/dL    Total Protein 6.5 6.0 - 8.5 g/dL    Albumin 3.9 3.5 - 5.2 g/dL    ALT (SGPT) 14 1 - 41 U/L    AST (SGOT) 19 1 - 40 U/L    Alkaline Phosphatase 73 39 - 117 U/L    Total Bilirubin 0.4 0.0 - 1.2 mg/dL    Globulin 2.6 gm/dL    A/G Ratio 1.5 g/dL    BUN/Creatinine Ratio 17.6 7.0 - 25.0    Anion Gap 9.1 5.0 - 15.0 mmol/L    eGFR 65.3 >60.0 mL/min/1.73   Lipase    Specimen: Arm, Right; Blood   Result Value Ref Range    Lipase 73 (H) 13 - 60 U/L   Urinalysis With Microscopic If Indicated (No Culture) - Urine, Clean Catch    Specimen: Urine, Clean Catch   Result Value Ref Range    Color, UA Yellow Yellow, Straw    Appearance, UA Clear Clear    pH, UA 5.5 5.0 - 8.0    Specific Gravity, UA 1.020 1.005 - 1.030    Glucose, UA Negative Negative    Ketones, UA Negative Negative    Bilirubin, UA Negative Negative    Blood, UA Negative Negative    Protein, UA Trace (A) Negative    Leuk Esterase, UA Negative Negative    Nitrite, UA Negative Negative    Urobilinogen, UA 1.0 E.U./dL 0.2 - 1.0 E.U./dL   Lactic Acid, Plasma    Specimen: Arm, Right; Blood   Result Value Ref Range    Lactate 1.2 0.5 - 2.0 mmol/L   CBC Auto Differential    Specimen: Arm, Right; Blood   Result Value Ref Range    WBC 8.04 3.40 - 10.80 10*3/mm3    RBC 3.94 (L) 4.14 - 5.80 10*6/mm3    Hemoglobin 12.7 (L) 13.0 - 17.7 g/dL    Hematocrit 36.8 (L) 37.5 - 51.0 %    MCV 93.4 79.0 - 97.0 fL    MCH 32.2 26.6 - 33.0 pg    MCHC 34.5 31.5 - 35.7 g/dL    RDW 14.7 12.3 - 15.4 %    RDW-SD 49.6 37.0 - 54.0 fl    MPV 10.4 6.0 - 12.0 fL    Platelets 199 140 - 450 10*3/mm3    Neutrophil % 60.5 42.7 - 76.0 %    Lymphocyte % 23.6 19.6 - 45.3 %    Monocyte % 11.1 5.0 - 12.0 %    Eosinophil % 3.4 0.3 - 6.2 %    Basophil % 1.0 0.0 - 1.5 %    Immature Grans % 0.4 0.0 - 0.5 %    Neutrophils, Absolute 4.87 1.70 - 7.00 10*3/mm3    Lymphocytes, Absolute 1.90 0.70 - 3.10 10*3/mm3    Monocytes, Absolute 0.89 0.10 - 0.90 10*3/mm3    Eosinophils, Absolute 0.27 0.00 - 0.40  10*3/mm3    Basophils, Absolute 0.08 0.00 - 0.20 10*3/mm3    Immature Grans, Absolute 0.03 0.00 - 0.05 10*3/mm3    nRBC 0.0 0.0 - 0.2 /100 WBC   Green Top (Gel)   Result Value Ref Range    Extra Tube Hold for add-ons.    Lavender Top   Result Value Ref Range    Extra Tube hold for add-on    Gold Top - SST   Result Value Ref Range    Extra Tube Hold for add-ons.    Light Blue Top   Result Value Ref Range    Extra Tube Hold for add-ons.             Procedures        Assessment and Plan    Diagnoses and all orders for this visit:    1. Pain in testicle, unspecified laterality (Primary)  -     US Scrotum & Testicles; Future  -     doxycycline (VIBRAMYCIN) 100 MG capsule; Take 1 capsule by mouth 2 (Two) Times a Day for 28 days.  Dispense: 56 capsule; Refill: 0  -     Lactobacillus (Florajen Acidophilus) capsule; Take 1 capsule by mouth Daily.  Dispense: 28 capsule; Refill: 0    2. Chronic prostatitis    3. Penile pain    Given the tenderness along the posterior aspect of his right testicle I am going to treat him for possible epididymitis as well as prostatitis.  We will also have an ultrasound of his testicles and scrotum.    We also discussed that with his history of IC he should be monitoring his diet to ensure that he is avoiding triggers.    Given the penile pain and lack of obvious changes to his penis, I am going to set him up to see one of the urologist for further evaluation.      Follow Up   No follow-ups on file.  Patient was given instructions and counseling regarding his condition or for health maintenance advice. Please see specific information pulled into the AVS if appropriate.         This document has been electronically signed by Lydia Monterroso, LUMA  October 8, 2024 09:12 EDT

## 2024-10-08 ENCOUNTER — OFFICE VISIT (OUTPATIENT)
Dept: UROLOGY | Facility: CLINIC | Age: 69
End: 2024-10-08
Payer: MEDICARE

## 2024-10-08 VITALS
DIASTOLIC BLOOD PRESSURE: 72 MMHG | SYSTOLIC BLOOD PRESSURE: 131 MMHG | HEIGHT: 70 IN | HEART RATE: 70 BPM | BODY MASS INDEX: 29.95 KG/M2 | WEIGHT: 209.22 LBS

## 2024-10-08 DIAGNOSIS — N48.89 PENILE PAIN: ICD-10-CM

## 2024-10-08 DIAGNOSIS — N50.819 PAIN IN TESTICLE, UNSPECIFIED LATERALITY: Primary | ICD-10-CM

## 2024-10-08 DIAGNOSIS — N41.1 CHRONIC PROSTATITIS: ICD-10-CM

## 2024-10-08 RX ORDER — DOXYCYCLINE 100 MG/1
100 CAPSULE ORAL 2 TIMES DAILY
Qty: 56 CAPSULE | Refills: 0 | Status: SHIPPED | OUTPATIENT
Start: 2024-10-08 | End: 2024-11-05

## 2024-10-08 RX ORDER — LACTOBACILLUS ACIDOPHILUS 20B CELL
1 CAPSULE ORAL DAILY
Qty: 28 CAPSULE | Refills: 0 | Status: SHIPPED | OUTPATIENT
Start: 2024-10-08

## 2024-10-24 ENCOUNTER — OFFICE VISIT (OUTPATIENT)
Dept: CARDIOLOGY | Facility: CLINIC | Age: 69
End: 2024-10-24
Payer: MEDICARE

## 2024-10-24 ENCOUNTER — TELEPHONE (OUTPATIENT)
Dept: CARDIOLOGY | Facility: CLINIC | Age: 69
End: 2024-10-24

## 2024-10-24 VITALS
HEIGHT: 70 IN | DIASTOLIC BLOOD PRESSURE: 65 MMHG | HEART RATE: 76 BPM | BODY MASS INDEX: 30.32 KG/M2 | WEIGHT: 211.8 LBS | SYSTOLIC BLOOD PRESSURE: 100 MMHG

## 2024-10-24 DIAGNOSIS — I95.1 ORTHOSTATIC HYPOTENSION: Primary | ICD-10-CM

## 2024-10-24 RX ORDER — SODIUM CHLORIDE 1 G/1
1 TABLET ORAL 3 TIMES DAILY
Qty: 30 TABLET | Refills: 3 | Status: SHIPPED | OUTPATIENT
Start: 2024-10-24

## 2024-10-24 RX ORDER — FLUDROCORTISONE ACETATE 0.1 MG/1
0.1 TABLET ORAL DAILY
Qty: 60 TABLET | Refills: 3 | Status: SHIPPED | OUTPATIENT
Start: 2024-10-24

## 2024-10-24 NOTE — PATIENT INSTRUCTIONS
Start taking midodrine 5 mg 3 times a day starting at 6 AM in the morning then at noon and then at 6 PM.  Take it regularly.  If you consistently see blood pressure to be less than 100 or you feel lightheaded or dizzy you can increase the dose to 10 mg.  Take the morning dose.    Before taking your noon and evening dose check your blood pressure if your blood pressures above 120 you can skip the dose.

## 2024-10-24 NOTE — PROGRESS NOTES
Interventional Cardiology Patient Visit Follow Up Note      History of Presenting Illness:  History of Present Illness  The patient is a 69-year-old male presenting for a follow-up on orthostatic hypotension, previously confirmed with tilt table testing. He is accompanied by her spouse.    He reports persistent lightheadedness, which he believes is not alleviated by his medication. His blood pressure often remains below 100, despite taking 5 mg of midodrine three times a day. He monitors his blood pressure regularly and notes that it consistently stays under 100. He mentions that his vision becomes blurry when his blood pressure drops. He also reports feeling faint.    He maintains a diet of at least two meals a day but has noticed a decrease in his appetite and subsequent weight loss. He does not consume alcohol. He has not tried salt tablets but does add salt to his food. He drinks plenty of water and avoids coffee and soda.    He wears compression stockings regularly and has a history of severe varicose veins. He used to walk 2 to 3 miles daily but now feels too weak and lightheaded to do so. He has a blood pressure cuff at home but finds it difficult to use and is considering switching to a wrist cuff.    SOCIAL HISTORY  He does not smoke or drink alcohol.      Past Medical History  Past Medical History:   Diagnosis Date    Arthritis     Bell palsy     Carotid artery occlusion     Coronary artery disease     Difficulty walking     Headache, tension-type     Kidney stones     Prostate infection     Sleep apnea     Uveitis     bilateral         Current Outpatient Medications:     aspirin 81 MG EC tablet, Take 1 tablet by mouth Daily., Disp: , Rfl:     Cholecalciferol 125 MCG (5000 UT) tablet, Vitamin D3 125 mcg (5,000 unit) oral tablet take 1 tablet by oral route daily   Active, Disp: , Rfl:     dorzolamide-timolol (COSOPT) 2-0.5 % ophthalmic solution, instill 1 drop into each eye twice daily, Disp: , Rfl:      doxycycline (VIBRAMYCIN) 100 MG capsule, Take 1 capsule by mouth 2 (Two) Times a Day for 28 days., Disp: 56 capsule, Rfl: 0    Flarex 0.1 % ophthalmic suspension, Administer 1 drop to both eyes 3 (Three) Times a Day., Disp: , Rfl:     folic acid (FOLVITE) 1 MG tablet, Take 1 tablet by mouth Daily., Disp: , Rfl:     Lactobacillus (Florajen Acidophilus) capsule, Take 1 capsule by mouth Daily., Disp: 28 capsule, Rfl: 0    latanoprost (XALATAN) 0.005 % ophthalmic solution, Administer 1 drop to both eyes Daily., Disp: , Rfl:     methotrexate 2.5 MG tablet, 1 tablet. 10 pills once a week, Disp: , Rfl:     midodrine (PROAMATINE) 5 MG tablet, Take 1 tablet by mouth 3 (Three) Times a Day Before Meals., Disp: 90 tablet, Rfl: 3    multivitamins-minerals (PRESERVISION AREDS 2) capsule capsule, Take 1 capsule by mouth 2 (Two) Times a Day., Disp: , Rfl:     prednisoLONE acetate (PRED FORTE) 1 % ophthalmic suspension, instill 1 drop into each eye twice daily, Disp: , Rfl:     rosuvastatin (CRESTOR) 5 MG tablet, TAKE 1 TABLET BY MOUTH ONCE DAILY, Disp: , Rfl:     tadalafil (CIALIS) 5 MG tablet, Take 1 tablet by mouth every night at bedtime., Disp: , Rfl:     sodium chloride 1 g tablet, Take 1 tablet by mouth 3 (Three) Times a Day., Disp: 30 tablet, Rfl: 3  Current outpatient and discharge medications have been reconciled for the patient.  Reviewed by: Christophe Colón MD     There are no discontinued medications.    Allergies   Allergen Reactions    Penicillins Swelling     Had as child. Swollen lips.     Trimethoprim Rash    Ciprofloxacin Rash    Sulfa Antibiotics Rash    Sulfamethoxazole-Trimethoprim Rash        Social History     Tobacco Use    Smoking status: Never     Passive exposure: Never    Smokeless tobacco: Never   Vaping Use    Vaping status: Never Used   Substance Use Topics    Alcohol use: Never    Drug use: Never       Family History   Problem Relation Age of Onset    Dementia Mother     Migraines Brother      "      Objective   /65 (BP Location: Right arm, Patient Position: Sitting, Cuff Size: Large Adult)   Pulse 76   Ht 177.8 cm (70\")   Wt 96.1 kg (211 lb 12.8 oz)   BMI 30.39 kg/m²     Wt Readings from Last 3 Encounters:   10/24/24 96.1 kg (211 lb 12.8 oz)   10/08/24 94.9 kg (209 lb 3.5 oz)   10/06/24 94.9 kg (209 lb 3.5 oz)     BP Readings from Last 3 Encounters:   10/24/24 100/65   10/08/24 131/72   10/06/24 141/81       Physical Exam  Constitutional:  Awake. Not in acute distress. Normal appearance.   Neck: No carotid bruit, hepatojugular reflux or JVD.   Cardiovascular:      Rate and Rhythm: Normal rate and regular rhythm.      Chest Wall: PMI is not displaced.      Heart sounds: Normal heart sounds, S1 normal and S2 normal. No murmur heard.       No friction rub. No gallop. No S3 or S4 sounds.    Pulmonary: Pulmonary effort is normal. Normal breath sounds. No wheezing, rhonchi or rales.   Extremities: No Bilateral edema is noted.   Skin: Warm and dry. Non cyanotic, No petechiae or rash.   Neurological: Alert and oriented x 3  Psychiatric:  Behavior is cooperative.       Result Review :   The following data was reviewed by Christophe Colón MD on 10/24/2024   No results found for: \"PROBNP\"  CMP          10/6/2024    16:48   CMP   Glucose 170    BUN 21    Creatinine 1.19    EGFR 65.3    Sodium 140    Potassium 4.1    Chloride 107    Calcium 9.4    Total Protein 6.5    Albumin 3.9    Globulin 2.6    Total Bilirubin 0.4    Alkaline Phosphatase 73    AST (SGOT) 19    ALT (SGPT) 14    Albumin/Globulin Ratio 1.5    BUN/Creatinine Ratio 17.6    Anion Gap 9.1      CBC w/diff          10/6/2024    16:48   CBC w/Diff   WBC 8.04    RBC 3.94    Hemoglobin 12.7    Hematocrit 36.8    MCV 93.4    MCH 32.2    MCHC 34.5    RDW 14.7    Platelets 199    Neutrophil Rel % 60.5    Immature Granulocyte Rel % 0.4    Lymphocyte Rel % 23.6    Monocyte Rel % 11.1    Eosinophil Rel % 3.4    Basophil Rel % 1.0       Lab Results " "  Component Value Date    TSH 2.120 06/25/2020      Lab Results   Component Value Date    FREET4 1.3 06/25/2020      No results found for: \"DDIMERQUANT\"  No results found for: \"MG\"   No results found for: \"DIGOXIN\"   No results found for: \"TROPONINT\"   No results found for: \"POCTROP\"           Results for orders placed during the hospital encounter of 09/17/24    Adult Transthoracic Echo Complete W/ Cont if Necessary Per Protocol    Interpretation Summary  Normal chamber sizes.  LV has normal wall thickness.  No regional wall motion abnormalities are present.  Systolic function is normal calculated LVEF is greater than 55%.  Diastolic function is normal.  RV has normal systolic function.  Trileaflet aortic valve.  There is no aortic valve stenosis.  Mitral valve has thickened leaflets with preserved leaflet excursion.  There is no significant mitral regurgitation.  Grossly normal tricuspid valve.  Trace TR is noted.  RVSP cannot be calculated due to incomplete TR jet no pericardial effusion is noted.  Aortic root and ascending aorta has normal dimensions.  IVC is normal size.  Estimated right atrial pressure is 0 to 5 mmHg    No prior studies available for comparison     Results for orders placed during the hospital encounter of 09/17/24    Adult Transthoracic Echo Complete W/ Cont if Necessary Per Protocol    Interpretation Summary  Normal chamber sizes.  LV has normal wall thickness.  No regional wall motion abnormalities are present.  Systolic function is normal calculated LVEF is greater than 55%.  Diastolic function is normal.  RV has normal systolic function.  Trileaflet aortic valve.  There is no aortic valve stenosis.  Mitral valve has thickened leaflets with preserved leaflet excursion.  There is no significant mitral regurgitation.  Grossly normal tricuspid valve.  Trace TR is noted.  RVSP cannot be calculated due to incomplete TR jet no pericardial effusion is noted.  Aortic root and ascending aorta has " normal dimensions.  IVC is normal size.  Estimated right atrial pressure is 0 to 5 mmHg    No prior studies available for comparison     No results found for this or any previous visit.          The ASCVD Risk score ([place] DK, et al., 2019) failed to calculate for the following reasons:    Cannot find a previous HDL lab    Cannot find a previous total cholesterol lab          1. Orthostatic hypotension        Diagnoses and all orders for this visit:    1. Orthostatic hypotension (Primary)  -     sodium chloride 1 g tablet; Take 1 tablet by mouth 3 (Three) Times a Day.  Dispense: 30 tablet; Refill: 3  -     Compression Stockings              Assessment & Plan  1. Orthostatic Hypotension.  2.  Syncope secondary to orthostatic hypotension    Despite the use of midodrine, he continues to experience lightheadedness and dizziness. The dosage of midodrine will be increased to 10 mg, to be taken every 6 hours.   Previously he was taking 5 mg on as-needed basis  He is advised to start with 5 mg in the morning and increase to 10 mg if symptoms persist.   He should monitor his blood pressure before each dose and skip the dose if his blood pressure exceeds 120.   Salt tablets will be provided to increase salt intake.   The use of thigh-high compression stockings and an abdominal binders was recommended to prevent blood pooling in the legs.     He is also advised to consume small, frequent meals instead of larger ones to minimize blood pressure drops after eating.    Engage in regular seated or recumbent exercise. If despite these measures patient continues continues to have orthostatic hypotension will consider adding pyridostigmine, fludrocortisone, droxidopa and other alternatives.    Other nonpharmacological measures such as climbing stairs once every day for 30 minutes will be discussed in subsequent visit    Follow-up  Return in 4 weeks for follow up.      Follow Up     Return in about 4 weeks (around  11/21/2024).      Christophe Colón MD  Interventional Cardiology  10/24/2024  15:24 EDT      Patient was given instructions and counseling regarding his condition or for health maintenance advice. Please see specific information pulled into the AVS if appropriate.     Please note that portions of this document were completed using a voice recognition program.     Patient or patient representative verbalized consent for the use of Ambient Listening during the visit with  Christophe Colón MD for chart documentation. 10/24/2024  15:24 EDT

## 2024-10-24 NOTE — TELEPHONE ENCOUNTER
Caller: DEE SALVADOR    Relationship: Emergency Contact    Best call back number: [phone] (home)      What is the best time to reach you: ANYTIME     Who are you requesting to speak with (clinical staff, provider,  specific staff member): CLINICAL         What was the call regarding: PATIENT NEEDS TO KNOW WHAT RATIO ON THE COMPRESSION SOCKS TO ORDER.     Is it okay if the provider responds through MyChart: YES

## 2024-10-30 ENCOUNTER — TELEPHONE (OUTPATIENT)
Dept: CARDIOLOGY | Facility: CLINIC | Age: 69
End: 2024-10-30
Payer: MEDICARE

## 2024-10-30 ENCOUNTER — HOSPITAL ENCOUNTER (OUTPATIENT)
Dept: ULTRASOUND IMAGING | Facility: HOSPITAL | Age: 69
Discharge: HOME | End: 2024-10-30
Admitting: NURSE PRACTITIONER
Payer: MEDICARE

## 2024-10-30 ENCOUNTER — PATIENT MESSAGE (OUTPATIENT)
Dept: CARDIOLOGY | Facility: CLINIC | Age: 69
End: 2024-10-30
Payer: MEDICARE

## 2024-10-30 DIAGNOSIS — N50.819 PAIN IN TESTICLE, UNSPECIFIED LATERALITY: ICD-10-CM

## 2024-10-30 PROCEDURE — 76870 US EXAM SCROTUM: CPT

## 2024-10-30 NOTE — TELEPHONE ENCOUNTER
Caller: DEE SALVADOR    Relationship: Emergency Contact    Best call back number: [phone]     Which medication are you concerned about: FLUDROCORTISONE AND MIDODRINE     Who prescribed you this medication: DERIK    When did you start taking this medication: N/A    What are your concerns: EYE PRESSURE IS THROUGH THE ROOF AND THEY WENT TO THE EYE DOCOTR AND SAID THAT HE CAN NOT USE THE MEDICATION AND WANTS ALTERNATIVES     How long have you had these concerns: 1 DAY

## 2024-10-30 NOTE — TELEPHONE ENCOUNTER
It may have happened secondary to flornef, because you have been taking midodrine before.   You can continue midodrine at reduced dose that is 5 mg. If you continue to have eye discomfort, please let me know.

## 2024-10-31 NOTE — PROGRESS NOTES
Please advise patient that he has a small epididymal cyst that is not a cause for concern. He can keep his f/u appt with Dr. Cade as previously scheduled.

## 2024-11-01 ENCOUNTER — TELEPHONE (OUTPATIENT)
Dept: CARDIOLOGY | Facility: CLINIC | Age: 69
End: 2024-11-01
Payer: MEDICARE

## 2024-11-01 NOTE — PROGRESS NOTES
Patient increase midodrine dose to 10 mg.  His systolic blood pressure was reported to be in 120s however he developed ocular pain shortly after it.  He was evaluated by ophthalmology who mentioned that it has raised intraocular pressure.  He is not a candidate for midodrine therapy therefore.  Will go ahead and discontinue midodrine.  Patient did not take salt tablets or Florinef either.  He is advised by his eye doctor to not take these medications this puts him at risk of losing his vision.  He feels fine.  He does not report dizziness.    Compression stocking handout was provided to the patient.  He is going to try compression stockings and see if it helps him with orthostatic hypotension.  If patient continues to feel lightheaded and dizzy will prescribe alternative medications.

## 2024-12-07 PROBLEM — N48.89 PENILE PAIN: Status: ACTIVE | Noted: 2024-12-07

## 2024-12-07 PROBLEM — N41.1 CHRONIC PROSTATITIS: Status: ACTIVE | Noted: 2024-12-07

## 2024-12-07 NOTE — PROGRESS NOTES
Chief Complaint: Urologic complaint    Subjective         History of Present Illness  José Lyles is a 69 y.o. male       Penile pain  Chronic prostatitis    Penile pain has resolved with tighter fitting underwear/antibiotics    10/24 NP - penile pain which is achy intermittent. -Doxycycline x 1 month - no side effects    10/24 ED visit for penile pain.  10/24 CT abdomen/pelvis with - small bilateral inguinal hernias which contain fat.  Larger on the left.  Renal cortical scarring bilaterally.  No stones on the left, right kidney has 2 mm and 3 mm nonobstructing stone.  Images reviewed    10/24 scrotal ultrasound -5 mm right epididymal head cyst, negative otherwise    10/24 UA - trace protein negative otherwise, 1.9, GFR 65    9/24 started Flomax - could not tolerate cause dizziness    No GH    No ED, no curvature    On tadalafil 5 mg daily.  No side effects has been on this > 1 year.  Helping    Voiding okay.  Not bothered.  incontinence  Nocturia 1  No Straining.      History of nephrolithiasis passed x 1  No  surgeries  No family history of malignancy  CAD  Aspirin 81  Non-smoker        PSA    6/24     0.41  2020    0.51      Objective                   Assessment and Plan    Diagnoses and all orders for this visit:    1. Penile pain (Primary)    2. Chronic prostatitis             Penile pain  Chronic prostatitis  BPH      No pain    Continue tadalafil 5 mg daily      Follow-up with NP in 1 year

## 2024-12-10 ENCOUNTER — OFFICE VISIT (OUTPATIENT)
Dept: UROLOGY | Age: 69
End: 2024-12-10
Payer: MEDICARE

## 2024-12-10 VITALS — HEIGHT: 70 IN | BODY MASS INDEX: 30.21 KG/M2 | WEIGHT: 211 LBS

## 2024-12-10 DIAGNOSIS — N41.1 CHRONIC PROSTATITIS: ICD-10-CM

## 2024-12-10 DIAGNOSIS — N48.89 PENILE PAIN: Primary | ICD-10-CM

## 2025-02-07 ENCOUNTER — OFFICE VISIT (OUTPATIENT)
Age: 70
End: 2025-02-07
Payer: MEDICARE

## 2025-02-07 ENCOUNTER — HOSPITAL ENCOUNTER (OUTPATIENT)
Facility: HOSPITAL | Age: 70
Discharge: HOME | End: 2025-02-07
Admitting: SURGERY
Payer: COMMERCIAL

## 2025-02-07 VITALS
SYSTOLIC BLOOD PRESSURE: 140 MMHG | WEIGHT: 207.9 LBS | DIASTOLIC BLOOD PRESSURE: 76 MMHG | BODY MASS INDEX: 29.76 KG/M2 | HEIGHT: 70 IN

## 2025-02-07 DIAGNOSIS — I95.1 ORTHOSTATIC HYPOTENSION: ICD-10-CM

## 2025-02-07 DIAGNOSIS — I65.23 CAROTID STENOSIS, BILATERAL: Primary | ICD-10-CM

## 2025-02-07 DIAGNOSIS — I65.23 BILATERAL CAROTID ARTERY STENOSIS: ICD-10-CM

## 2025-02-07 LAB
BH CV XLRA MEAS LEFT CAROTID BULB EDV: -11.5 CM/SEC
BH CV XLRA MEAS LEFT CAROTID BULB PSV: -32.9 CM/SEC
BH CV XLRA MEAS LEFT DIST CCA EDV: 21.7 CM/SEC
BH CV XLRA MEAS LEFT DIST CCA PSV: 84 CM/SEC
BH CV XLRA MEAS LEFT DIST ICA EDV: -27.3 CM/SEC
BH CV XLRA MEAS LEFT DIST ICA PSV: -86.2 CM/SEC
BH CV XLRA MEAS LEFT ICA/CCA RATIO: 1.02
BH CV XLRA MEAS LEFT MID CCA EDV: 25.1 CM/SEC
BH CV XLRA MEAS LEFT MID CCA PSV: 83.2 CM/SEC
BH CV XLRA MEAS LEFT MID ICA EDV: -27.3 CM/SEC
BH CV XLRA MEAS LEFT MID ICA PSV: -75.2 CM/SEC
BH CV XLRA MEAS LEFT PROX CCA EDV: 24.1 CM/SEC
BH CV XLRA MEAS LEFT PROX CCA PSV: 114.1 CM/SEC
BH CV XLRA MEAS LEFT PROX ECA EDV: -13.9 CM/SEC
BH CV XLRA MEAS LEFT PROX ECA PSV: -91.8 CM/SEC
BH CV XLRA MEAS LEFT PROX ICA EDV: -23 CM/SEC
BH CV XLRA MEAS LEFT PROX ICA PSV: -77.7 CM/SEC
BH CV XLRA MEAS LEFT PROX SCLA PSV: 114.4 CM/SEC
BH CV XLRA MEAS LEFT VERTEBRAL A EDV: 10.1 CM/SEC
BH CV XLRA MEAS LEFT VERTEBRAL A PSV: 33.4 CM/SEC
BH CV XLRA MEAS RIGHT CAROTID BULB EDV: 13.2 CM/SEC
BH CV XLRA MEAS RIGHT CAROTID BULB PSV: 42.1 CM/SEC
BH CV XLRA MEAS RIGHT DIST CCA EDV: -20.5 CM/SEC
BH CV XLRA MEAS RIGHT DIST CCA PSV: -66.5 CM/SEC
BH CV XLRA MEAS RIGHT DIST ICA EDV: -19.9 CM/SEC
BH CV XLRA MEAS RIGHT DIST ICA PSV: -64.6 CM/SEC
BH CV XLRA MEAS RIGHT ICA/CCA RATIO: 1.63
BH CV XLRA MEAS RIGHT MID CCA EDV: 23.4 CM/SEC
BH CV XLRA MEAS RIGHT MID CCA PSV: 91.8 CM/SEC
BH CV XLRA MEAS RIGHT MID ICA EDV: -29.8 CM/SEC
BH CV XLRA MEAS RIGHT MID ICA PSV: -79.2 CM/SEC
BH CV XLRA MEAS RIGHT PROX CCA EDV: 21.7 CM/SEC
BH CV XLRA MEAS RIGHT PROX CCA PSV: 104.8 CM/SEC
BH CV XLRA MEAS RIGHT PROX ECA EDV: -10.6 CM/SEC
BH CV XLRA MEAS RIGHT PROX ECA PSV: -75.2 CM/SEC
BH CV XLRA MEAS RIGHT PROX ICA EDV: -36.8 CM/SEC
BH CV XLRA MEAS RIGHT PROX ICA PSV: -109 CM/SEC
BH CV XLRA MEAS RIGHT PROX SCLA PSV: 90.1 CM/SEC
BH CV XLRA MEAS RIGHT VERTEBRAL A EDV: -13.7 CM/SEC
BH CV XLRA MEAS RIGHT VERTEBRAL A PSV: -44.7 CM/SEC
LEFT ARM BP: NORMAL MMHG
RIGHT ARM BP: NORMAL MMHG

## 2025-02-07 PROCEDURE — 93880 EXTRACRANIAL BILAT STUDY: CPT

## 2025-02-07 NOTE — PROGRESS NOTES
Chief Complaint  Carotid Artery Disease    Subjective        José Lyles presents to [facility] V[facility]ULAR [facility]  HPI   José Lyles is a 69 y.o. male that has been followed in our office for carotid artery stenosis. He returns today in follow up along with a carotid duplex. He  reports he has been doing well without hospitalizations or surgeries. He denies any symptoms consistent with CVA, TIA, or amaurosis fugax. His biggest complaint is dizziness, especially with standing.  He does have issues with low blood pressure.  He also has been having issues with memory loss.    Review of Systems   Constitutional:  Negative for fever.   Eyes:  Negative for visual disturbance.   Cardiovascular:  Negative for leg swelling.   Gastrointestinal:  Negative for abdominal pain.   Musculoskeletal:  Negative for back pain.   Skin:  Negative for color change, pallor and wound.   Neurological:  Negative for dizziness, facial asymmetry, speech difficulty and weakness.        José Lyels  reports that he has never smoked. He has never been exposed to tobacco smoke. He has never used smokeless tobacco..        Objective   Vital Signs:  Vitals:    02/07/25 1104   BP: 140/76      Body mass index is 29.83 kg/m².   BMI is >= 30 and <35. (Class 1 Obesity). The following options were offered after discussion;: information on healthy weight added to patient's after visit summary        Physical Exam  Vitals reviewed.   Constitutional:       Appearance: Normal appearance.   HENT:      Head: Normocephalic.   Cardiovascular:      Rate and Rhythm: Normal rate and regular rhythm.      Pulses:           Dorsalis pedis pulses are 3+ on the right side and 3+ on the left side.        Posterior tibial pulses are 3+ on the right side and 3+ on the left side.   Pulmonary:      Effort: Pulmonary effort is normal.   Skin:     General: Skin is warm.   Neurological:      General: No focal deficit present.      Mental Status: He is alert  and oriented to person, place, and time.   Psychiatric:         Mood and Affect: Mood normal.          Result Review :      Previous carotid duplex: Less than 50% stenosis bilaterally    Carotid duplex from today: Duplex Carotid Ultrasound CAR (02/07/2025 10:55)                    Assessment and Plan     Diagnoses and all orders for this visit:    1. Carotid stenosis, bilateral (Primary)  -     Duplex Carotid Ultrasound CAR; Future    2. Orthostatic hypotension             Patient present today for follow up of carotid artery stenosis.  Today, he has a less than 50% stenosis bilaterally.  This is unchanged from previous imaging.  He is to continue his antiplatelet agent which is aspirin. He is on a statin for cholesterol control.  We discussed adequate blood pressure control. He will return in 1 year along with a repeat carotid artery duplex.  Discussed the dizziness being related to orthostatic hypotension.  I recommended that he increase his sodium and fluid intake.  We also discussed his memory loss.  I recommended the [facility] for evaluation.    Follow Up     Return in about 2 years (around 2/7/2027) for carotid duplex.  Patient was given instructions and counseling regarding his condition or for health maintenance advice. Please see specific information pulled into the AVS if appropriate.     LUMA Styles